# Patient Record
Sex: MALE | Race: WHITE | NOT HISPANIC OR LATINO | Employment: FULL TIME | URBAN - METROPOLITAN AREA
[De-identification: names, ages, dates, MRNs, and addresses within clinical notes are randomized per-mention and may not be internally consistent; named-entity substitution may affect disease eponyms.]

---

## 2023-10-04 PROBLEM — B35.6 TINEA CRURIS: Status: ACTIVE | Noted: 2023-10-04

## 2023-10-04 PROBLEM — K57.32 SIGMOID DIVERTICULITIS: Status: ACTIVE | Noted: 2023-10-04

## 2023-10-04 PROBLEM — K29.60 BILE REFLUX GASTRITIS: Status: ACTIVE | Noted: 2023-10-04

## 2023-10-04 PROBLEM — K92.1 HEMATOCHEZIA: Status: ACTIVE | Noted: 2023-10-04

## 2023-10-04 PROBLEM — L20.9 ATOPIC DERMATITIS, UNSPECIFIED: Status: ACTIVE | Noted: 2023-01-10

## 2023-10-04 RX ORDER — CYCLOBENZAPRINE HCL 10 MG
1 TABLET ORAL NIGHTLY PRN
COMMUNITY
Start: 2023-07-27 | End: 2023-10-09 | Stop reason: ALTCHOICE

## 2023-10-04 RX ORDER — AMOXICILLIN AND CLAVULANATE POTASSIUM 875; 125 MG/1; MG/1
TABLET, FILM COATED ORAL
COMMUNITY
Start: 2022-12-14 | End: 2023-10-09 | Stop reason: ALTCHOICE

## 2023-10-04 RX ORDER — NAPROXEN 500 MG/1
1 TABLET ORAL EVERY 12 HOURS PRN
COMMUNITY
Start: 2023-07-27 | End: 2023-10-09 | Stop reason: ALTCHOICE

## 2023-10-04 RX ORDER — OMEPRAZOLE 40 MG/1
1 CAPSULE, DELAYED RELEASE ORAL
COMMUNITY
Start: 2023-08-04 | End: 2023-10-09 | Stop reason: ALTCHOICE

## 2023-10-09 ENCOUNTER — OFFICE VISIT (OUTPATIENT)
Dept: PRIMARY CARE | Facility: CLINIC | Age: 23
End: 2023-10-09
Payer: COMMERCIAL

## 2023-10-09 VITALS
SYSTOLIC BLOOD PRESSURE: 126 MMHG | HEART RATE: 74 BPM | DIASTOLIC BLOOD PRESSURE: 72 MMHG | OXYGEN SATURATION: 96 % | HEIGHT: 69 IN | WEIGHT: 148.6 LBS | TEMPERATURE: 98.7 F | BODY MASS INDEX: 22.01 KG/M2

## 2023-10-09 DIAGNOSIS — M54.9 UPPER BACK PAIN ON LEFT SIDE: Primary | ICD-10-CM

## 2023-10-09 DIAGNOSIS — L21.9 SEBORRHEIC DERMATITIS: ICD-10-CM

## 2023-10-09 PROCEDURE — 99213 OFFICE O/P EST LOW 20 MIN: CPT | Performed by: INTERNAL MEDICINE

## 2023-10-09 PROCEDURE — 1036F TOBACCO NON-USER: CPT | Performed by: INTERNAL MEDICINE

## 2023-10-09 RX ORDER — METHYLPREDNISOLONE 4 MG/1
TABLET ORAL
Qty: 21 TABLET | Refills: 0 | Status: SHIPPED | OUTPATIENT
Start: 2023-10-09 | End: 2023-10-16

## 2023-10-09 ASSESSMENT — PATIENT HEALTH QUESTIONNAIRE - PHQ9
2. FEELING DOWN, DEPRESSED OR HOPELESS: NOT AT ALL
1. LITTLE INTEREST OR PLEASURE IN DOING THINGS: NOT AT ALL
SUM OF ALL RESPONSES TO PHQ9 QUESTIONS 1 AND 2: 0

## 2023-10-09 ASSESSMENT — PAIN SCALES - GENERAL: PAINLEVEL: 6

## 2023-10-09 ASSESSMENT — ENCOUNTER SYMPTOMS
LOSS OF SENSATION IN FEET: 0
OCCASIONAL FEELINGS OF UNSTEADINESS: 0
DEPRESSION: 0

## 2023-10-09 NOTE — PROGRESS NOTES
"Subjective   Patient ID: James Johnson is a 23 y.o. male who presents for Back Pain (Bump on forehead).    HPI     Patient is complaining of upper back, neck pain radiating towards back of lower head since last few months Pain is 5-6/10. Has tingling in the middle of the spine. Has pain during the week when he lists heavy stuff more than 25 pounds at work. Has no pain during weekend when he rests.  Ibuprofen 400 mg helps a little.  Naprosyn caused stomach pain, and had no releif with cyclobenzaprinf    Review of Systems   Constitutional:  Negative for fatigue and unexpected weight change.   HENT:  Negative for congestion, ear pain, sinus pain and sore throat.    Respiratory:  Negative for cough, shortness of breath and wheezing.    Cardiovascular:  Negative for chest pain, palpitations and leg swelling.   Gastrointestinal:  Negative for abdominal pain, blood in stool and constipation.   Endocrine: Negative for polydipsia, polyphagia and polyuria.   Genitourinary:  Negative for hematuria and urgency.   Musculoskeletal:  Positive for back pain (Upper back pain). Negative for arthralgias and joint swelling.   Skin:  Negative for rash.        Tiny bump on left side of forehead near hairline   Neurological:  Negative for tremors, syncope, weakness and numbness.   Psychiatric/Behavioral:  Negative for behavioral problems. The patient is not nervous/anxious.        Objective   /72 (BP Location: Left arm, Patient Position: Sitting, BP Cuff Size: Adult)   Pulse 74   Temp 37.1 °C (98.7 °F) (Temporal)   Ht 1.753 m (5' 9\")   Wt 67.4 kg (148 lb 9.6 oz)   SpO2 96%   BMI 21.94 kg/m²     Physical Exam  Constitutional:       Appearance: Normal appearance.   HENT:      Head: Normocephalic and atraumatic.   Eyes:      Extraocular Movements: Extraocular movements intact.   Cardiovascular:      Rate and Rhythm: Normal rate and regular rhythm.      Pulses: Normal pulses.      Heart sounds: Normal heart sounds. "   Pulmonary:      Effort: Pulmonary effort is normal.      Breath sounds: Normal breath sounds.   Abdominal:      General: Abdomen is flat. Bowel sounds are normal.   Musculoskeletal:      Thoracic back: No deformity, spasms, tenderness or bony tenderness. No scoliosis.   Skin:     Comments: Left side of forehead small nontender swelling- benign? Dry scaly scalp   Neurological:      Mental Status: He is alert.         Assessment/Plan       James was seen today for back pain.  Diagnoses and all orders for this visit:  Upper back pain on left side (Primary)  -     XR thoracic spine 3 views; Future  -     Referral to Physical Therapy; Future  -     methylPREDNISolone (Medrol Dospak) 4 mg tablets; Take as directed on package.  Seborrheic dermatitis  Comments:  Advised to use head and shoulders shampoo, wash at least twice a week     Advised follow-up in 4 to 6 weeks if no relief with physical therapy

## 2023-10-11 RX ORDER — HYDROCORTISONE 25 MG/G
CREAM TOPICAL
COMMUNITY
Start: 2023-02-24 | End: 2023-10-12 | Stop reason: ALTCHOICE

## 2023-10-11 ASSESSMENT — ENCOUNTER SYMPTOMS
ABDOMINAL PAIN: 0
UNEXPECTED WEIGHT CHANGE: 0
SINUS PAIN: 0
HEMATURIA: 0
WHEEZING: 0
ARTHRALGIAS: 0
PALPITATIONS: 0
SHORTNESS OF BREATH: 0
NERVOUS/ANXIOUS: 0
TREMORS: 0
NUMBNESS: 0
FATIGUE: 0
COUGH: 0
BACK PAIN: 1
SORE THROAT: 0
CONSTIPATION: 0
POLYDIPSIA: 0
JOINT SWELLING: 0
WEAKNESS: 0
POLYPHAGIA: 0
BLOOD IN STOOL: 0

## 2023-10-12 ENCOUNTER — OFFICE VISIT (OUTPATIENT)
Dept: SURGERY | Facility: CLINIC | Age: 23
End: 2023-10-12
Payer: COMMERCIAL

## 2023-10-12 VITALS
HEART RATE: 109 BPM | WEIGHT: 150 LBS | OXYGEN SATURATION: 98 % | SYSTOLIC BLOOD PRESSURE: 106 MMHG | BODY MASS INDEX: 21.47 KG/M2 | HEIGHT: 70 IN | TEMPERATURE: 98.1 F | DIASTOLIC BLOOD PRESSURE: 62 MMHG

## 2023-10-12 DIAGNOSIS — K64.8 BLEEDING INTERNAL HEMORRHOIDS: Primary | ICD-10-CM

## 2023-10-12 PROCEDURE — 46221 LIGATION OF HEMORRHOID(S): CPT | Performed by: SURGERY

## 2023-10-12 PROCEDURE — 1036F TOBACCO NON-USER: CPT | Performed by: SURGERY

## 2023-10-12 PROCEDURE — 99212 OFFICE O/P EST SF 10 MIN: CPT | Performed by: SURGERY

## 2023-10-12 ASSESSMENT — PATIENT HEALTH QUESTIONNAIRE - PHQ9
SUM OF ALL RESPONSES TO PHQ9 QUESTIONS 1 AND 2: 0
1. LITTLE INTEREST OR PLEASURE IN DOING THINGS: NOT AT ALL
2. FEELING DOWN, DEPRESSED OR HOPELESS: NOT AT ALL

## 2023-10-12 ASSESSMENT — LIFESTYLE VARIABLES
HOW MANY STANDARD DRINKS CONTAINING ALCOHOL DO YOU HAVE ON A TYPICAL DAY: 3 OR 4
AUDIT-C TOTAL SCORE: 3
HOW OFTEN DO YOU HAVE A DRINK CONTAINING ALCOHOL: 2-4 TIMES A MONTH
SKIP TO QUESTIONS 9-10: 0
HOW OFTEN DO YOU HAVE SIX OR MORE DRINKS ON ONE OCCASION: NEVER

## 2023-10-12 ASSESSMENT — PAIN SCALES - GENERAL: PAINLEVEL: 0-NO PAIN

## 2023-10-12 ASSESSMENT — ENCOUNTER SYMPTOMS: RECTAL BLEEDING: 1

## 2023-10-12 NOTE — LETTER
October 12, 2023     Patient: James Johnson   YOB: 2000   Date of Visit: 10/12/2023       To Whom It May Concern:    It is my medical opinion that James Johnson may return to full duty immediately with no restrictions.    If you have any questions or concerns, please don't hesitate to call.         Sincerely,        Josue Jimenez MD    CC: No Recipients

## 2023-10-12 NOTE — PROGRESS NOTES
Patient ID: James Johnson is a 23 y.o. male.    Anoscopy    Date/Time: 10/12/2023 11:08 AM    Performed by: Josue Jimenez MD  Authorized by: Josue Jimenez MD    Consent:     Consent obtained:  Written    Consent given by:  Patient    Risks, benefits, and alternatives were discussed: yes      Risks discussed:  Bleeding and pain    Alternatives discussed:  No treatment, alternative treatment and observation  Universal protocol:     Procedure explained and questions answered to patient or proxy's satisfaction: yes      Relevant documents present and verified: yes      Test results available: yes      Imaging studies available: yes      Required blood products, implants, devices, and special equipment available: yes      Site/side marked: yes      Immediately prior to procedure, a time out was called: yes      Patient identity confirmed:  Verbally with patient and provided demographic data  Indications:     Indications: rectal bleeding    Procedure details:     Internal hemorrhoids: yes      Internal hemorrhoid position:  Three o'clock    Inflammation: no      Anal fissures: no      Anal fistulae: no      Anal stricture: no      Abscess: no      Tearing: no      Blood in rectal vault: no    Post-procedure details:     Procedure completion:  Tolerated well, no immediate complications  Comments:      Risks, benefits, and alternatives were explained to the patient.  Risks include but are not limited to bleeding, recurrence of the hemorrhoids, need for further rubberbanding or surgical procedures, or pain.  Patient agrees to proceed and consents to anoscopy and rubber band ligation internal hemorrhoids.    The patient was placed in the prone jackknife position.  A lubricated anoscope was inserted and the anus was inspected.  No masses were noted.  The most prominent hemorrhoid was located in the right middle quadrant.  An asensate area of this hemorrhoid was grasped with the [place] forcep and 2 rubber bands  were applied.  The anoscope was then removed.  Estimated blood loss was 1 mL.  The patient tolerated the procedure well and was discharged to home in good condition.

## 2023-10-13 ENCOUNTER — EVALUATION (OUTPATIENT)
Dept: PHYSICAL THERAPY | Facility: CLINIC | Age: 23
End: 2023-10-13
Payer: COMMERCIAL

## 2023-10-13 DIAGNOSIS — M54.6 THORACIC BACK PAIN: Primary | ICD-10-CM

## 2023-10-13 PROCEDURE — 97161 PT EVAL LOW COMPLEX 20 MIN: CPT | Mod: GP

## 2023-10-13 PROCEDURE — 97110 THERAPEUTIC EXERCISES: CPT | Mod: GP

## 2023-10-13 NOTE — LETTER
October 13, 2023     Patient: James Johnson   YOB: 2000   Date of Visit: 10/13/2023       To Whom It May Concern:    It is my medical opinion that James Johnson {Work release (duty restriction):71344}.    If you have any questions or concerns, please don't hesitate to call.         Sincerely,        Chapo Mosquera, PT    CC: No Recipients

## 2023-10-13 NOTE — LETTER
October 13, 2023     Patient: James Johnson   YOB: 2000   Date of Visit: 10/13/2023       To Whom it May Concern:    James Johnson was seen in my clinic on 10/13/2023. He {Return to school/sport:52435}.    If you have any questions or concerns, please don't hesitate to call.         Sincerely,          Chapo Mosquera, PT        CC: No Recipients

## 2023-10-13 NOTE — PROGRESS NOTES
Physical Therapy Evaluation     Patient Name: James Johnson  MRN: [id number]  Today's Date: 10/13/2023  Time Calculation  Start Time: 0747  Stop Time: 0841  Time Calculation (min): 54 min      Insurance:  Visit number: 1 of ?  Authorization info: ?  Insurance Type: [place]    Current Problem:   1. Thoracic back pain  Follow Up In Physical Therapy          Precautions:       Reason for visit: Neck and left periscap pain   Referred by: Dr. 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   Leisure, mechanical stresses: Drive car, go to car shows, go our with friends.   Prior to onset the pt was able to complete all work/leisure/functional activities without limitation.  Functional disability from present episode: none stated.  Present symptoms: Neck and left periscap  Present since: July 2023 and unchanging  Commenced lifting something at work with a crane, grabbed a cart with left hand was pulled/twist   Symptoms at onset: Left periscap and neck, had some dizziness initally but that went away  Constant symptoms: None  Intermittent symptoms: Neck and left periscap  Pain ranges from: 0-7/10  Pt describes pain as: ache, tingling  Symptoms are worse with: lifting/straining,as the day progresses, when still/on the move, continuous use of left arm   Symptoms are better with:  significantly better with rest/on the weekends, lying, sitting, lying, am  Disturbed sleep:  no  Previous episodes: no  Previous treatments: Ibuprofen helps a little, Prescribed a steroid pack has not started it yet. Will have to go back to MD if medication and PT does not help.   Imaging: xrays order but not taken - T/S  Dizziness - yes initially but none now. tinnitus/nausea/swallowing - no  Red Flags: recent/major surgery - Colonoscopy in May 2023 , night pain - no, accidents - no, unexplained weight loss - no    C/S Objective:  Outcome Measures:  Other Measures  Neck Disability Index:  12%    - Posture: Poor   - Correction: Worse     - Lateral shift nil         - Movement Loss- Nil/Min/Mod/Max loss or degrees       - c/s Retraction: min       - c/s Protraction: nil       - c/s Flexion: nil       - c/s Extension: min - mild left deviation        - c/s R rot: nil       - c/s L rot: nil        - c/s R SB: nil       - c/s L SB: nil        - T/S R rot: nil       - T/S L rot nil       - T/S flexion: nil, ERP       - T/S ext: min        - B GHJs AROM: flexion/abd nil limit PDM   - MMT; B GHJs       - Flexion: 5/5, pain        - abd: 5/5        - ER: 5/5      - Repeated Movement Testing -  During/after/mechanical response:       -Sx in lyin-2/10 left periscap           - Prone to/from [place] x 3 min - Increase, NW           - REIL with hands high x 10 - NE, increase, NW           - Prone c/s ret/ext x 10 - NE, increasing, W       - Sx in sittin-3/10 left periscap           - T/S ext over chair x 20 - increase, NW          - C/S ret/ext x 10 - increase, NW         - Sx in lying 2/10 left periscap          - Sustained Left rot in flexion x 3 min - NE, NE    Treatment:      - C/s ret/ext x 10     - T/S ext over chair x 10   Educated pt on proper response to exercise, centralization/localization, produce/increase - NW principle, and assessment process.  Issued handout for HEP    HEP/med bridge: Access Code: [email or web address]  URL: [email or web address]  Date: 10/13/2023  Prepared by: Chapo Mosquera  Exercises  - Seated Cervical Extension AROM  - 5-8 x daily - 7 x weekly - 1 sets - 10-20 reps  - Seated Thoracic Lumbar Extension  - 5-8 x daily - 7 x weekly - 1 sets - 10-20 reps    Assessment:  Pt presents to PT with complaint of neck and left periscap pain/paresthesia that began in 2023 while attempting to prevent a cart from falling at work. Pt's history and response to repeated movements makes provisional classification c/s derangement. Pt will continue to be assessed over the next 3-5  sessions to confirm provisional classification and DP. Pt will benefit from skilled PT to address ROM/strength/functional limitations and pain noted during evaluation today.    Plan of Care:  - Problem List:Pain, Functional limitations, activity limitations, ROM limitations, Posture, Transfer, Activity Limitations, IADLS/ADLS/Self care  - C/S goals:  STG:  Pain = 0-3/10 neck/Left periscap by week 4  Pt will report good HEP compliance by week 2  LTG:   C/S and T/S  ROM = nil limit in all directions by week 6  NDI </= 2% by week 8  Pt will report >/= 80% improvement/progress towards PT goals by week 8  Pt will be able to lift heavy objects at work without increase/production of neck/periscap sx. For >/= 3 days a week by week 8   - Planned interventions: Ther ex, Neuromuscular re-ed, ther act, Manual, Education, Home program, Estim, Hot therapy, Cold therapy, Vaso

## 2023-10-16 ENCOUNTER — ANCILLARY PROCEDURE (OUTPATIENT)
Dept: RADIOLOGY | Facility: CLINIC | Age: 23
End: 2023-10-16
Payer: COMMERCIAL

## 2023-10-16 DIAGNOSIS — M54.9 UPPER BACK PAIN ON LEFT SIDE: ICD-10-CM

## 2023-10-16 PROCEDURE — 72072 X-RAY EXAM THORAC SPINE 3VWS: CPT | Performed by: RADIOLOGY

## 2023-10-16 PROCEDURE — 72072 X-RAY EXAM THORAC SPINE 3VWS: CPT | Mod: FY

## 2023-10-24 ENCOUNTER — TREATMENT (OUTPATIENT)
Dept: PHYSICAL THERAPY | Facility: CLINIC | Age: 23
End: 2023-10-24
Payer: COMMERCIAL

## 2023-10-24 DIAGNOSIS — M54.6 THORACIC BACK PAIN: ICD-10-CM

## 2023-10-24 PROCEDURE — 97110 THERAPEUTIC EXERCISES: CPT | Mod: GP,CQ

## 2023-10-24 PROCEDURE — 97140 MANUAL THERAPY 1/> REGIONS: CPT | Mod: GP,CQ

## 2023-10-24 PROCEDURE — 97010 HOT OR COLD PACKS THERAPY: CPT | Mod: GP,CQ

## 2023-10-24 PROCEDURE — 97014 ELECTRIC STIMULATION THERAPY: CPT | Mod: GP,CQ

## 2023-10-24 ASSESSMENT — PAIN - FUNCTIONAL ASSESSMENT: PAIN_FUNCTIONAL_ASSESSMENT: 0-10

## 2023-10-24 ASSESSMENT — PAIN SCALES - GENERAL: PAINLEVEL_OUTOF10: 0 - NO PAIN

## 2023-10-24 NOTE — PROGRESS NOTES
Physical Therapy Treatment    Patient Name: James Johnson  MRN: [id number]  Today's Date: 10/24/2023  Time Calculation  Start Time: 0830  Stop Time: 0927  Time Calculation (min): 57 min  Evaluating Therapist: Eveline  Co-Signing Therapist: Isaura  Current Problem  1. Thoracic back pain  Follow Up In Physical Therapy          Insurance:  Visit number: 2 of 60  Authorization info: no auth required   Insurance Type: [place]  Onset Date July 2023    Subjective   General  General Comment: Patient has been avoiding any heavy lifting at this time.  He feels when he gets to work, the pain will increase with repetitive heavy lifting. Pain can increase to 6/10 by the end of a work shift. Not much change w/ current HEP.    Performing HEP?: Yes    Precautions   Reviewed Medical Health History Form - [facility]  Pain  Pain Assessment: 0-10  Pain Score: 0 - No pain    Objective   Poor posture - FWD head and rounded shoulders.     Treatments:  Therapeutic Exercise  Therapeutic Exercise Activity 1: sitting with lumbar roll x 2 min  Therapeutic Exercise Activity 2: seated cervical ret/ext x 10  Therapeutic Exercise Activity 3: seated thoracic ext x 10  Therapeutic Exercise Activity 4: supine LG PVC FF x 2 min  Therapeutic Exercise Activity 5: supine YTB loop iso ER w/ serratus lift x 10  Therapeutic Exercise Activity 6: prone L shld ext w/ IR 2 x 10  Therapeutic Exercise Activity 7: prone H ABD 2 x 10  Therapeutic Exercise Activity 8: prone LT 2 x 10  Therapeutic Exercise Activity 9: doorway pec stretch 2 x 1 min              Manual Therapy  Manual Therapy Activity 1: MFR L arm pull  Manual Therapy Activity 2: STM/DSTM L med scap, rhomboids, UT/LEV    Modalities  Modality 1: Untimed ESU - Electrical Stimulation Unattended, Untimed Cold Pack (TENS to L thoracic/basilio-scap seated w/ CP 9.0 mA x 15' - bell)    OP EDUCATION:  Education  Education Comment: Access Code: [email or web address]  URL: [email or web address]  Date:  10/24/2023  Prepared by: Josephine Davey    Exercises  - Prone Shoulder Internal Rotation and Extension  - 1 x daily - 7 x weekly - 3 sets - 10 reps  - Prone Shoulder Horizontal Abduction  - 1 x daily - 7 x weekly - 3 sets - 10 reps  - Prone Single Arm Shoulder Y  - 1 x daily - 7 x weekly - 3 sets - 10 reps  - Doorway Pec Stretch at 90 Degrees Abduction  - 3 x daily - 7 x weekly - 2 reps - 60 hold    Assessment:  PT Assessment  Assessment Comment: Patient presents with poor posture and muscle imbalances.  Updated HEP to include pec stretching and prone scapular strengthening.  He was a bit sore after ther ex. Pain reduced w/ CP and TENS.    Plan:  OP PT Plan  Duration: 2/60  Assess TENS and scapular strengthening.   Goals:  Active       PT Problem       PT Goal 1       Start:  10/13/23    Expected End:  12/12/23       STG:  Pain = 0-3/10 neck/Left periscap by week 4  Pt will report good HEP compliance by week 2  LTG:   C/S and T/S  ROM = nil limit in all directions by week 6  NDI </= 2% by week 8  Pt will report >/= 80% improvement/progress towards PT goals by week 8  Pt will be able to lift heavy objects at work without increase/production of neck/periscap sx. For >/= 3 days a week by week 8               Dottie Davey, PTA

## 2023-10-31 ENCOUNTER — TREATMENT (OUTPATIENT)
Dept: PHYSICAL THERAPY | Facility: CLINIC | Age: 23
End: 2023-10-31
Payer: COMMERCIAL

## 2023-10-31 DIAGNOSIS — M54.6 THORACIC BACK PAIN, UNSPECIFIED BACK PAIN LATERALITY, UNSPECIFIED CHRONICITY: Primary | ICD-10-CM

## 2023-10-31 DIAGNOSIS — M54.6 THORACIC BACK PAIN: ICD-10-CM

## 2023-10-31 PROCEDURE — 97110 THERAPEUTIC EXERCISES: CPT | Mod: GP,CQ

## 2023-10-31 PROCEDURE — 97140 MANUAL THERAPY 1/> REGIONS: CPT | Mod: GP,CQ

## 2023-10-31 ASSESSMENT — PAIN SCALES - GENERAL: PAINLEVEL_OUTOF10: 0 - NO PAIN

## 2023-10-31 ASSESSMENT — PAIN - FUNCTIONAL ASSESSMENT: PAIN_FUNCTIONAL_ASSESSMENT: 0-10

## 2023-10-31 NOTE — PROGRESS NOTES
"  Physical Therapy Treatment    Patient Name: James Johnson  MRN: [id number]  Today's Date: 10/31/2023  Time Calculation  Start Time: 0807  Stop Time: 0832  Time Calculation (min): 25 min    Current Problem  1. Thoracic back pain, unspecified back pain laterality, unspecified chronicity        2. Thoracic back pain  Follow Up In Physical Therapy          Insurance:   3/60            Subjective   General  General Comment: Patient has been trying to avoid lifting heavy objects.  He did lift a bucket which caused some discomfort, which subsided by the next day.  Abreviated appt today - patient 20 min late. (Has not been able to do HEP due to being tired when he gets home from work)    Performing HEP?: No    Precautions     Pain  Pain Assessment: 0-10  Pain Score: 0 - No pain    Objective       Treatments:    Therapeutic Exercise  Therapeutic Exercise Activity 1: UBE Man L1 F/R x 4 min total  Therapeutic Exercise Activity 2: doorway pec stretch low and mid x 1 min ea  Therapeutic Exercise Activity 3: RTB rows 2 x 10  Therapeutic Exercise Activity 4: RTB shld ext 2 x 10  Therapeutic Exercise Activity 5: RTB B ER 2 x 10  Therapeutic Exercise Activity 6: supine Lg PVC FF 5\" x 2 min  Therapeutic Exercise Activity 7: supine chin tuck x 10         Manual Therapy  Manual Therapy Activity 1: SOR  Manual Therapy Activity 2: gentle cervical distraction  Manual Therapy Activity 3: stretch B UT/LEV                     OP EDUCATION:  Education  Education Comment: Access Code: [email or web address]  URL: [email or web address]  Date: 10/31/2023  Prepared by: Josephine Davey    Exercises  - Doorway Pec Stretch at 90 Degrees Abduction  - 1 x daily - 7 x weekly - 3 reps - 30 hold  - Shoulder External Rotation and Scapular Retraction with Resistance  - 1 x daily - 7 x weekly - 3 sets - 10 reps  - Standing Shoulder Row with Anchored Resistance  - 1 x daily - 7 x weekly - 3 sets - 10 reps  - Shoulder extension with resistance - " Neutral  - 1 x daily - 7 x weekly - 3 sets - 10 reps    Assessment:  PT Assessment  Assessment Comment: Modified session due to late arrival. Fatigues easily with ther ex.    Plan:  OP PT Plan  Duration: 3/60    Goals:  Active       PT Problem       PT Goal 1       Start:  10/13/23    Expected End:  12/12/23       STG:  Pain = 0-3/10 neck/Left periscap by week 4  Pt will report good HEP compliance by week 2  LTG:   C/S and T/S  ROM = nil limit in all directions by week 6  NDI </= 2% by week 8  Pt will report >/= 80% improvement/progress towards PT goals by week 8  Pt will be able to lift heavy objects at work without increase/production of neck/periscap sx. For >/= 3 days a week by week 8               Dottie Davey, PTA

## 2023-10-31 NOTE — PROGRESS NOTES
Physical Therapy Treatment    Patient Name: James Johnson  MRN: [id number]  Today's Date: 10/31/2023       Current Problem  No diagnosis found.    Insurance:               Subjective   General       Performing HEP?: {Yes/No:08209}    Precautions     Pain       Objective       Treatments:                                    OP EDUCATION:       Assessment:       Plan:       Goals:  Active       PT Problem       PT Goal 1       Start:  10/13/23    Expected End:  12/12/23       STG:  Pain = 0-3/10 neck/Left periscap by week 4  Pt will report good HEP compliance by week 2  LTG:   C/S and T/S  ROM = nil limit in all directions by week 6  NDI </= 2% by week 8  Pt will report >/= 80% improvement/progress towards PT goals by week 8  Pt will be able to lift heavy objects at work without increase/production of neck/periscap sx. For >/= 3 days a week by week 8               Dottie Davey, PTA

## 2023-11-07 ENCOUNTER — TREATMENT (OUTPATIENT)
Dept: PHYSICAL THERAPY | Facility: CLINIC | Age: 23
End: 2023-11-07
Payer: COMMERCIAL

## 2023-11-07 DIAGNOSIS — M54.6 THORACIC BACK PAIN: ICD-10-CM

## 2023-11-07 PROCEDURE — 97110 THERAPEUTIC EXERCISES: CPT | Mod: GP

## 2023-11-07 NOTE — PROGRESS NOTES
Physical Therapy Treatment    Patient Name: James Johnson  MRN: [id number]  Today's Date: 11/7/2023       Current Problem  1. Thoracic back pain  Follow Up In Physical Therapy          Insurance:  Number of Treatments Authorized: 4/8  Certification Period Start Date: 10/13/23  Certification Period End Date: 04/10/23      Subjective   General    - pain is better at night and in the am but then worsens during the day.  This is a typical cycle and lifting can flare the sx but then they get better with rest. Gets some burning with the exercise but not too bad.  Overall things are about the same since starting PT. Feels fine after each session, no better or worse.     Performing HEP?: yes x 1 a day.  From the last PT session    Precautions  Precautions  Precautions Comment: no fall risk  Pain   1/10 - burning left GHJ blade.     Objective   - Movement Loss - Nil/Min/Mod/Max loss or degrees      - Retraction: min, increase      - Protraction: NT      - Flexion: nil, increase/pull      - Extension: min, increase/pull      - R rot: nil      - L rot: nil      - R SB: nil, increase      - L SB: min, NE  Lifted and took several steps with 35# KB - min pain     Treatments:  Therex:   Scifit UE CW quick start x 6 min   C/S ret x 20 - increase, NW, NE on baselines   C/S ret with self OP x 20 - increase, W  C/S flexion with self OP x 10 - increase, W  Left SBing 10 x 2 - NE, NE  Left SBing with self OP x 10 - decrease, Better    OP EDUCATION:   Educated on importance of HEP compliance to assess and to expect improving in sx.     Access Code: [email or web address]  URL: [email or web address]  Date: 11/07/2023  Prepared by: Chapo Mosquera    Exercises  - Seated Cervical L Sidebending AROM  - 5-8 x daily - 7 x weekly - 1 sets - 10 reps  - Seated Cervical L Sidebending Stretch  - 5-8 x daily - 7 x weekly - 1 sets - 5 reps  - Seated Cervical Retraction  - 5-8 x daily - 7 x weekly - 1 sets - 10 reps    Assessment:   Pt  continues to have minimal progress with fair HEP compliance. Only loaded procedures were assessed today to enable for HEP compliance.  Had reduction in left periscap pain after left SBing, mid line procedures had no improvement or worsened sx.     Plan:  OP PT Plan  Certification Period Start Date: 10/13/23  Certification Period End Date: 04/10/23  Number of Treatments Authorized: 4/8    Goals:  Active       PT Problem       PT Goal 1       Start:  10/13/23    Expected End:  12/12/23       STG:  Pain = 0-3/10 neck/Left periscap by week 4  Pt will report good HEP compliance by week 2  LTG:   C/S and T/S  ROM = nil limit in all directions by week 6  NDI </= 2% by week 8  Pt will report >/= 80% improvement/progress towards PT goals by week 8  Pt will be able to lift heavy objects at work without increase/production of neck/periscap sx. For >/= 3 days a week by week 8               Chapo Mosquera, PT

## 2023-11-16 ENCOUNTER — APPOINTMENT (OUTPATIENT)
Dept: SURGERY | Facility: CLINIC | Age: 23
End: 2023-11-16
Payer: COMMERCIAL

## 2023-11-16 PROBLEM — K64.9 HEMORRHOIDS: Status: ACTIVE | Noted: 2023-11-16

## 2023-11-17 ENCOUNTER — TREATMENT (OUTPATIENT)
Dept: PHYSICAL THERAPY | Facility: CLINIC | Age: 23
End: 2023-11-17
Payer: COMMERCIAL

## 2023-11-17 DIAGNOSIS — M54.6 THORACIC BACK PAIN: ICD-10-CM

## 2023-11-17 PROCEDURE — 97110 THERAPEUTIC EXERCISES: CPT | Mod: GP

## 2023-11-17 NOTE — PROGRESS NOTES
Physical Therapy Treatment    Patient Name: James Johnson  MRN: [id number]  Today's Date: 11/17/2023  Time Calculation  Start Time: 0840  Stop Time: 0918  Time Calculation (min): 38 min    Current Problem  1. Thoracic back pain  Follow Up In Physical Therapy          Insurance:  Number of Treatments Authorized: 5/8  Certification Period Start Date: 10/13/23  Certification Period End Date: 04/10/24      Subjective   General    He is feeling better.  Been avoiding lifting things at work which he feels is helping.       Performing HEP?: yes x 5-6 a day Left SB, SB with OP and retraction     Precautions  Precautions  Precautions Comment: no fall risk  Pain   0/10 left periscap, neck stiffness/soreness - pt did not rate    Objective   - Movement Loss - Nil/Min/Mod/Max loss or degrees      - Retraction: min, increase      - Protraction: NT      - Flexion: nil, increase/pull      - Extension: min, increase/pull      - R rot: nil      - L rot: min      - R SB: nil, increase      - L SB: min, NE    Treatments:  Therex:   Left SBing x 10   Left SBing with self OP x 10 - P left lat, NW  C/S ret x 10   C/S ret with self OP 10 x 2 - increase, NW, NE on ROM   C/S ret/ext x 10 - increase, P periscap   C/S left rot x 10 - increase,   C/S left rot with OP x 10 - increase, NW  Matrix:  B rows 10#s 1 min x 2   B ext 12.5#s 1 min x 2   B overhead press 5#s 12 x 2   Matrix: chest Press 10#s 10 x 2     OP EDUCATION:  Access Code: [email or web address]  URL: [email or web address]  Date: 11/07/2023  Prepared by: Chapo Moqsuera    Exercises  - Seated Cervical L Sidebending AROM  - 5-8 x daily - 7 x weekly - 1 sets - 10 reps  - Seated Cervical L Sidebending Stretch  - 5-8 x daily - 7 x weekly - 1 sets - 5 reps  - Left rot x 10, 5-8 x a day   - Seated Cervical Retraction  - 5-8 x daily - 7 x weekly - 1 sets - 10 reps    Access Code: [email or web address]  URL: [email or web address]  Date: 11/17/2023  Prepared by:  Chapo Mosquera    Exercises  - Standing Shoulder Row with Anchored Resistance  - 1 x daily - 2-3 x weekly - 2-3 sets - 10-20 reps  - Shoulder extension with resistance - Neutral  - 1 x daily - 2-3 x weekly - 2-3 sets - 10-20 reps  - Shoulder Press on Swiss Ball  - 1 x daily - 2-3 x weekly - 2-3 sets - 10-20 reps  - Pec Fly Machine  - 1 x daily - 2-3 x weekly - 2-3 sets - 10-20 reps    Assessment:   Pt continues to respond best to lateral procedures.  Midline has nil effect or worsens baselines, unable to assess unloaded/alternatives due to inability to be compliant with these procedures with HEP.  Tolerated all scp/UE strengthening well, c/o of periscap sx with GHJ press but returned to baseline quickly with rest.     Plan:  OP PT Plan  Certification Period Start Date: 10/13/23  Certification Period End Date: 04/10/24  Number of Treatments Authorized: 5/8    Goals:  Active       PT Problem       PT Goal 1       Start:  10/13/23    Expected End:  12/12/23       STG:  Pain = 0-3/10 neck/Left periscap by week 4  Pt will report good HEP compliance by week 2  LTG:   C/S and T/S  ROM = nil limit in all directions by week 6  NDI </= 2% by week 8  Pt will report >/= 80% improvement/progress towards PT goals by week 8  Pt will be able to lift heavy objects at work without increase/production of neck/periscap sx. For >/= 3 days a week by week 8               Chapo Mosquera, PT

## 2023-11-20 ENCOUNTER — APPOINTMENT (OUTPATIENT)
Dept: SURGERY | Facility: CLINIC | Age: 23
End: 2023-11-20
Payer: COMMERCIAL

## 2023-11-28 ENCOUNTER — APPOINTMENT (OUTPATIENT)
Dept: PHYSICAL THERAPY | Facility: CLINIC | Age: 23
End: 2023-11-28
Payer: COMMERCIAL

## 2023-12-08 ENCOUNTER — TREATMENT (OUTPATIENT)
Dept: PHYSICAL THERAPY | Facility: CLINIC | Age: 23
End: 2023-12-08
Payer: COMMERCIAL

## 2023-12-08 DIAGNOSIS — M54.6 THORACIC BACK PAIN: ICD-10-CM

## 2023-12-08 PROCEDURE — 97140 MANUAL THERAPY 1/> REGIONS: CPT | Mod: GP

## 2023-12-08 PROCEDURE — 97110 THERAPEUTIC EXERCISES: CPT | Mod: GP

## 2023-12-08 NOTE — PROGRESS NOTES
Physical Therapy Treatment    Patient Name: James Johnson  MRN: [id number]  Today's Date: 12/8/2023       Current Problem  1. Thoracic back pain  Follow Up In Physical Therapy          Insurance:  Number of Treatments Authorized: 6/8  Certification Period Start Date: 10/13/23  Certification Period End Date: 04/10/24      Subjective   General    Overall about the same.  His heavy lifting at work at work has increased again and his sx flared last night.     Performing HEP?: yes    Precautions  Precautions  Precautions Comment: no fall risk  Pain   4/10 left periscap - pt did not rate    Objective   - Movement Loss - Nil/Min/Mod/Max loss or degrees      - Retraction: min      - Protraction: NT      - Flexion: nil      - Extension: min      - R rot: nil      - L rot: nil      - R SB: nil       - L SB: nil   Left GHJ scapular humeral pattern - normal  Left GHJ abd/flexion = nil limit, good movement pattern.     Treatments:  Therex:   Scifit UE F/B x 3/3 min   T/S ext over chair x 1 min  T/S left rot x 1 min   Right rot, rot with self OP x 1 min each - NE, NE  Right Sbing x 1 min   Left SBing with self OP x 1 min   C/S Flexion x 1 min   C/S ret x 1 min   C/S ret/ext x 1 min   Supine with Green band x 1 min each:  B Hor abd  B overhead band pull down  B ER    Manual:  DTM/TPR to L mid trap/rhomboids/ thoracic paraspinals   C7-T3 ext mobs in prone grade 2-3    OP EDUCATION:  Educated on proper sx response with updated HEP    Access Code: [email or web address]  URL: [email or web address]  Date: 12/08/2023  Prepared by: Chapo Mosquera    Program Notes  If one movement helps with the pain you can do it more often, and do it when your pain is increased    Exercises  - Seated Cervical Extension AROM  - 2-3 x daily - 7 x weekly - 1 sets - 10-20 reps  - Seated Cervical Sidebending AROM  - 2-3 x daily - 7 x weekly - 1 sets - 10-20 reps  - Seated Cervical Rotation AROM  - 2-3 x daily - 7 x weekly - 1 sets - 10-20  reps  - Seated Thoracic Lumbar Extension  - 2-3 x daily - 7 x weekly - 1 sets - 10-20 reps  - Seated Cervical Flexion AROM  - 2-3 x daily - 7 x weekly - 1 sets - 10-20 reps  - Seated Thoracic Flexion and Rotation with Arms Crossed  - 2-3 x daily - 7 x weekly - 10-20 reps    Access Code: [email or web address]  URL: [email or web address]  Date: 11/17/2023  Prepared by: Chapo Mosquera    Exercises  - Standing Shoulder Row with Anchored Resistance  - 1 x daily - 2-3 x weekly - 2-3 sets - 10-20 reps  - Shoulder extension with resistance - Neutral  - 1 x daily - 2-3 x weekly - 2-3 sets - 10-20 reps  - Shoulder Press on Swiss Ball  - 1 x daily - 2-3 x weekly - 2-3 sets - 10-20 reps  - Pec Fly Machine  - 1 x daily - 2-3 x weekly - 2-3 sets - 10-20 reps    Assessment:   Pt has continued to be complaint with HEP but has not had improvements in baselines.  Tolerated ROM in al directions well, only minimal increase in L periscap pain. [place] better leaving PT after soft tissue mobs. Pt demo'd understanding of changes to HEP and proper sx response to exercise.    Plan:  OP PT Plan  Certification Period Start Date: 10/13/23  Certification Period End Date: 04/10/24  Number of Treatments Authorized: 6/8    Goals:  Active       PT Problem       PT Goal 1       Start:  10/13/23    Expected End:  12/12/23       STG:  Pain = 0-3/10 neck/Left periscap by week 4  Pt will report good HEP compliance by week 2  LTG:   C/S and T/S  ROM = nil limit in all directions by week 6  NDI </= 2% by week 8  Pt will report >/= 80% improvement/progress towards PT goals by week 8  Pt will be able to lift heavy objects at work without increase/production of neck/periscap sx. For >/= 3 days a week by week 8               Chapo Mosquera, PT

## 2023-12-13 ENCOUNTER — APPOINTMENT (OUTPATIENT)
Dept: RADIOLOGY | Facility: HOSPITAL | Age: 23
End: 2023-12-13
Payer: COMMERCIAL

## 2023-12-13 ENCOUNTER — HOSPITAL ENCOUNTER (EMERGENCY)
Facility: HOSPITAL | Age: 23
Discharge: HOME | End: 2023-12-13
Attending: STUDENT IN AN ORGANIZED HEALTH CARE EDUCATION/TRAINING PROGRAM
Payer: COMMERCIAL

## 2023-12-13 VITALS
OXYGEN SATURATION: 99 % | DIASTOLIC BLOOD PRESSURE: 80 MMHG | HEART RATE: 82 BPM | RESPIRATION RATE: 16 BRPM | HEIGHT: 69 IN | BODY MASS INDEX: 22.92 KG/M2 | WEIGHT: 154.76 LBS | SYSTOLIC BLOOD PRESSURE: 125 MMHG | TEMPERATURE: 99 F

## 2023-12-13 DIAGNOSIS — R07.9 ACUTE CHEST PAIN: Primary | ICD-10-CM

## 2023-12-13 LAB
ANION GAP SERPL CALC-SCNC: 8 MMOL/L
BASOPHILS # BLD AUTO: 0.07 X10*3/UL (ref 0–0.1)
BASOPHILS NFR BLD AUTO: 0.9 %
BUN SERPL-MCNC: 10 MG/DL (ref 8–25)
CALCIUM SERPL-MCNC: 9.6 MG/DL (ref 8.5–10.4)
CHLORIDE SERPL-SCNC: 102 MMOL/L (ref 97–107)
CO2 SERPL-SCNC: 30 MMOL/L (ref 24–31)
CREAT SERPL-MCNC: 0.8 MG/DL (ref 0.4–1.6)
D DIMER PPP FEU-MCNC: <0.19 MG/L FEU (ref 0.19–0.5)
EOSINOPHIL # BLD AUTO: 0.16 X10*3/UL (ref 0–0.7)
EOSINOPHIL NFR BLD AUTO: 2 %
ERYTHROCYTE [DISTWIDTH] IN BLOOD BY AUTOMATED COUNT: 13 % (ref 11.5–14.5)
GFR SERPL CREATININE-BSD FRML MDRD: >90 ML/MIN/1.73M*2
GLUCOSE SERPL-MCNC: 92 MG/DL (ref 65–99)
HCT VFR BLD AUTO: 45.8 % (ref 41–52)
HGB BLD-MCNC: 15.1 G/DL (ref 13.5–17.5)
IMM GRANULOCYTES # BLD AUTO: 0.02 X10*3/UL (ref 0–0.7)
IMM GRANULOCYTES NFR BLD AUTO: 0.3 % (ref 0–0.9)
LYMPHOCYTES # BLD AUTO: 1.62 X10*3/UL (ref 1.2–4.8)
LYMPHOCYTES NFR BLD AUTO: 20.4 %
MCH RBC QN AUTO: 26.7 PG (ref 26–34)
MCHC RBC AUTO-ENTMCNC: 33 G/DL (ref 32–36)
MCV RBC AUTO: 81 FL (ref 80–100)
MONOCYTES # BLD AUTO: 0.54 X10*3/UL (ref 0.1–1)
MONOCYTES NFR BLD AUTO: 6.8 %
NEUTROPHILS # BLD AUTO: 5.52 X10*3/UL (ref 1.2–7.7)
NEUTROPHILS NFR BLD AUTO: 69.6 %
NRBC BLD-RTO: 0 /100 WBCS (ref 0–0)
PLATELET # BLD AUTO: 286 X10*3/UL (ref 150–450)
POTASSIUM SERPL-SCNC: 4 MMOL/L (ref 3.4–5.1)
RBC # BLD AUTO: 5.66 X10*6/UL (ref 4.5–5.9)
SODIUM SERPL-SCNC: 140 MMOL/L (ref 133–145)
TROPONIN T SERPL-MCNC: <6 NG/L
WBC # BLD AUTO: 7.9 X10*3/UL (ref 4.4–11.3)

## 2023-12-13 PROCEDURE — 36415 COLL VENOUS BLD VENIPUNCTURE: CPT | Performed by: STUDENT IN AN ORGANIZED HEALTH CARE EDUCATION/TRAINING PROGRAM

## 2023-12-13 PROCEDURE — 99284 EMERGENCY DEPT VISIT MOD MDM: CPT | Performed by: STUDENT IN AN ORGANIZED HEALTH CARE EDUCATION/TRAINING PROGRAM

## 2023-12-13 PROCEDURE — 71046 X-RAY EXAM CHEST 2 VIEWS: CPT | Mod: FY

## 2023-12-13 PROCEDURE — 85300 ANTITHROMBIN III ACTIVITY: CPT | Performed by: STUDENT IN AN ORGANIZED HEALTH CARE EDUCATION/TRAINING PROGRAM

## 2023-12-13 PROCEDURE — 99283 EMERGENCY DEPT VISIT LOW MDM: CPT | Mod: 25

## 2023-12-13 PROCEDURE — 80048 BASIC METABOLIC PNL TOTAL CA: CPT | Performed by: STUDENT IN AN ORGANIZED HEALTH CARE EDUCATION/TRAINING PROGRAM

## 2023-12-13 PROCEDURE — 85025 COMPLETE CBC W/AUTO DIFF WBC: CPT | Performed by: STUDENT IN AN ORGANIZED HEALTH CARE EDUCATION/TRAINING PROGRAM

## 2023-12-13 PROCEDURE — 84484 ASSAY OF TROPONIN QUANT: CPT | Performed by: STUDENT IN AN ORGANIZED HEALTH CARE EDUCATION/TRAINING PROGRAM

## 2023-12-13 ASSESSMENT — COLUMBIA-SUICIDE SEVERITY RATING SCALE - C-SSRS
2. HAVE YOU ACTUALLY HAD ANY THOUGHTS OF KILLING YOURSELF?: NO
1. IN THE PAST MONTH, HAVE YOU WISHED YOU WERE DEAD OR WISHED YOU COULD GO TO SLEEP AND NOT WAKE UP?: NO
6. HAVE YOU EVER DONE ANYTHING, STARTED TO DO ANYTHING, OR PREPARED TO DO ANYTHING TO END YOUR LIFE?: NO

## 2023-12-13 ASSESSMENT — PAIN SCALES - GENERAL: PAINLEVEL_OUTOF10: 5 - MODERATE PAIN

## 2023-12-13 ASSESSMENT — PAIN DESCRIPTION - LOCATION: LOCATION: CHEST

## 2023-12-13 ASSESSMENT — PAIN - FUNCTIONAL ASSESSMENT: PAIN_FUNCTIONAL_ASSESSMENT: 0-10

## 2023-12-13 NOTE — Clinical Note
James Johnson was seen and treated in our emergency department on 12/13/2023.  He may return to work on 12/14/2023.       If you have any questions or concerns, please don't hesitate to call.      Renu Benavides MD

## 2023-12-13 NOTE — DISCHARGE INSTRUCTIONS
You are seen in the emergency department today for chest pain.  At this time, your labs and chest x-ray are reassuring.  You do not need to stay in the hospital.  Please follow-up with your primary care doctor as needed.

## 2023-12-22 ENCOUNTER — APPOINTMENT (OUTPATIENT)
Dept: PHYSICAL THERAPY | Facility: CLINIC | Age: 23
End: 2023-12-22
Payer: COMMERCIAL

## 2024-01-03 ENCOUNTER — HOSPITAL ENCOUNTER (OUTPATIENT)
Dept: CARDIOLOGY | Facility: HOSPITAL | Age: 24
Discharge: HOME | End: 2024-01-03
Payer: COMMERCIAL

## 2024-01-03 PROCEDURE — 93005 ELECTROCARDIOGRAM TRACING: CPT

## 2024-01-04 LAB
ATRIAL RATE: 89 BPM
P AXIS: 79 DEGREES
P OFFSET: 205 MS
P ONSET: 150 MS
PR INTERVAL: 140 MS
Q ONSET: 220 MS
QRS COUNT: 14 BEATS
QRS DURATION: 88 MS
QT INTERVAL: 330 MS
QTC CALCULATION(BAZETT): 401 MS
QTC FREDERICIA: 376 MS
R AXIS: 42 DEGREES
T AXIS: 71 DEGREES
T OFFSET: 385 MS
VENTRICULAR RATE: 89 BPM

## 2024-01-11 ENCOUNTER — PROCEDURE VISIT (OUTPATIENT)
Dept: SURGERY | Facility: CLINIC | Age: 24
End: 2024-01-11
Payer: COMMERCIAL

## 2024-01-11 VITALS
BODY MASS INDEX: 22.66 KG/M2 | SYSTOLIC BLOOD PRESSURE: 110 MMHG | OXYGEN SATURATION: 98 % | HEART RATE: 103 BPM | DIASTOLIC BLOOD PRESSURE: 66 MMHG | WEIGHT: 153 LBS | TEMPERATURE: 97.8 F | HEIGHT: 69 IN

## 2024-01-11 DIAGNOSIS — K60.2 ANAL FISSURE: Primary | ICD-10-CM

## 2024-01-11 PROCEDURE — 46600 DIAGNOSTIC ANOSCOPY SPX: CPT | Performed by: SURGERY

## 2024-01-11 PROCEDURE — 99213 OFFICE O/P EST LOW 20 MIN: CPT | Performed by: SURGERY

## 2024-01-11 ASSESSMENT — ENCOUNTER SYMPTOMS
ABDOMINAL DISTENTION: 0
NERVOUS/ANXIOUS: 0
BLOOD IN STOOL: 0
SORE THROAT: 0
CHEST TIGHTNESS: 0
RECTAL PAIN: 1
DIFFICULTY URINATING: 0
ABDOMINAL PAIN: 0
COLOR CHANGE: 0
SHORTNESS OF BREATH: 0
NECK PAIN: 0
DIARRHEA: 0
FACIAL SWELLING: 0
NAUSEA: 0
TROUBLE SWALLOWING: 0
LIGHT-HEADEDNESS: 0
EYE REDNESS: 0
ANAL BLEEDING: 1
CHILLS: 0
FEVER: 0
BRUISES/BLEEDS EASILY: 0
ADENOPATHY: 0
CONSTIPATION: 0
CONFUSION: 0
WOUND: 0
BACK PAIN: 0
RECTAL BLEEDING: 1
WEAKNESS: 0
VOMITING: 0
SPEECH DIFFICULTY: 0

## 2024-01-11 ASSESSMENT — PAIN SCALES - GENERAL: PAINLEVEL: 0-NO PAIN

## 2024-01-11 NOTE — ASSESSMENT & PLAN NOTE
Nitro-Bid ointment sent to his pharmacy.  Instructed to apply twice per day.  Follow-up in 6 weeks.

## 2024-01-11 NOTE — PROGRESS NOTES
Patient ID: James Johnson is a 23 y.o. male.Subjective   Patient ID: James Johnson is a 23 y.o. male who presents for Hemorrhoids (S/p hemorrhoid banding 10/12/2023/Symptoms improved until mid December/Currently experiencing pain with bm and bleeding/Denies diarrhea/constipation ).  Was doing well for over 2 months after banding of a hemorrhoid in the fall, but developed some pain and bleeding over the last week.  He cannot pinpoint the area of pain or discomfort.  It follows bowel movements and has a burning stinging nature.    He states that he has been compliant with daily psyllium fiber supplementation and spending less than 1 to 2 minutes on the toilet at a time.  He does not report any changes in bowel habits.        Review of Systems   Constitutional:  Negative for chills and fever.   HENT:  Negative for facial swelling, sore throat and trouble swallowing.    Eyes:  Negative for redness and visual disturbance.   Respiratory:  Negative for chest tightness and shortness of breath.    Cardiovascular:  Negative for chest pain and leg swelling.   Gastrointestinal:  Positive for anal bleeding and rectal pain. Negative for abdominal distention, abdominal pain, blood in stool, constipation, diarrhea, nausea and vomiting.   Endocrine: Negative for cold intolerance and heat intolerance.   Genitourinary:  Negative for difficulty urinating and enuresis.   Musculoskeletal:  Negative for back pain, gait problem and neck pain.   Skin:  Negative for color change, rash and wound.   Allergic/Immunologic: Negative for immunocompromised state.   Neurological:  Negative for speech difficulty, weakness and light-headedness.   Hematological:  Negative for adenopathy. Does not bruise/bleed easily.   Psychiatric/Behavioral:  Negative for confusion. The patient is not nervous/anxious.        Objective   Physical Exam  Constitutional:       General: He is not in acute distress.     Appearance: He is not toxic-appearing.    HENT:      Head: Normocephalic and atraumatic.      Mouth/Throat:      Mouth: Mucous membranes are moist.      Pharynx: Oropharynx is clear.   Eyes:      General: No scleral icterus.     Extraocular Movements: Extraocular movements intact.      Pupils: Pupils are equal, round, and reactive to light.   Cardiovascular:      Rate and Rhythm: Normal rate and regular rhythm.   Pulmonary:      Effort: Pulmonary effort is normal.      Breath sounds: Normal breath sounds.   Abdominal:      General: There is no distension.      Palpations: Abdomen is soft. There is no mass.      Tenderness: There is no abdominal tenderness. There is no guarding.      Hernia: No hernia is present.   Genitourinary:     Comments: Anoscopy shows a very small, less than 6 mm in size posterior fissure without active bleeding at the time of examination.  Musculoskeletal:         General: No swelling. Normal range of motion.      Cervical back: Normal range of motion.   Skin:     General: Skin is warm and dry.      Coloration: Skin is not jaundiced.   Neurological:      General: No focal deficit present.      Mental Status: He is alert and oriented to person, place, and time.   Psychiatric:         Mood and Affect: Mood normal.         Behavior: Behavior normal.         Assessment/Plan   Problem List Items Addressed This Visit             ICD-10-CM    Anal fissure - Primary K60.2     Nitro-Bid ointment sent to his pharmacy.  Instructed to apply twice per day.  Follow-up in 6 weeks.         Relevant Medications    nitroglycerin (Abraham-Bid) 2 % ointment            Josue Jimenez MD 01/11/24 9:10 AM     Anoscopy    Date/Time: 1/11/2024 9:09 AM    Performed by: Josue Jimenez MD  Authorized by: Josue Jimenez MD    Consent:     Consent obtained:  Written    Consent given by:  Patient    Risks, benefits, and alternatives were discussed: yes      Risks discussed:  Bleeding and pain    Alternatives discussed:  No treatment, alternative treatment and  observation  Universal protocol:     Procedure explained and questions answered to patient or proxy's satisfaction: yes      Relevant documents present and verified: yes      Test results available: yes      Imaging studies available: yes      Required blood products, implants, devices, and special equipment available: yes      Site/side marked: yes      Patient identity confirmed:  Verbally with patient  Indications:     Indications: rectal bleeding and rectal pain    Procedure details:     Internal hemorrhoids: no      Inflammation: no      Anal fissures: yes      Anal fissure position:  Twelve o'clock    Anal fistulae: no      Anal stricture: no      Abscess: no      Tearing: no      Blood in rectal vault: no    Post-procedure details:     Procedure completion:  Tolerated well, no immediate complications

## 2024-01-16 ENCOUNTER — APPOINTMENT (OUTPATIENT)
Dept: DERMATOLOGY | Facility: CLINIC | Age: 24
End: 2024-01-16
Payer: COMMERCIAL

## 2024-01-17 ENCOUNTER — OFFICE VISIT (OUTPATIENT)
Dept: DERMATOLOGY | Facility: CLINIC | Age: 24
End: 2024-01-17
Payer: COMMERCIAL

## 2024-01-17 DIAGNOSIS — L20.9 ATOPIC DERMATITIS AND RELATED CONDITION: Primary | ICD-10-CM

## 2024-01-17 PROCEDURE — 1036F TOBACCO NON-USER: CPT | Performed by: NURSE PRACTITIONER

## 2024-01-17 PROCEDURE — 99213 OFFICE O/P EST LOW 20 MIN: CPT | Performed by: NURSE PRACTITIONER

## 2024-01-17 ASSESSMENT — DERMATOLOGY QUALITY OF LIFE (QOL) ASSESSMENT
RATE HOW BOTHERED YOU ARE BY EFFECTS OF YOUR SKIN PROBLEMS ON YOUR ACTIVITIES (EG, GOING OUT, ACCOMPLISHING WHAT YOU WANT, WORK ACTIVITIES OR YOUR RELATIONSHIPS WITH OTHERS): 0 - NEVER BOTHERED
RATE HOW BOTHERED YOU ARE BY SYMPTOMS OF YOUR SKIN PROBLEM (EG, ITCHING, STINGING BURNING, HURTING OR SKIN IRRITATION): 1
RATE HOW EMOTIONALLY BOTHERED YOU ARE BY YOUR SKIN PROBLEM (FOR EXAMPLE, WORRY, EMBARRASSMENT, FRUSTRATION): 0 - NEVER BOTHERED
WHAT SINGLE SKIN CONDITION LISTED BELOW IS THE PATIENT ANSWERING THE QUALITY-OF-LIFE ASSESSMENT QUESTIONS ABOUT: DERMATITIS

## 2024-01-17 ASSESSMENT — PHYSICIAN GLOBAL ASSESSMENT (PGA): WHAT IS THE PGA: PHYSICIAN GLOBAL ASSESSMENT:  1 - MINIMAL

## 2024-01-17 ASSESSMENT — PATIENT GLOBAL ASSESSMENT (PGA): PATIENT GLOBAL ASSESSMENT: PATIENT GLOBAL ASSESSMENT:  2 - MILD

## 2024-01-17 ASSESSMENT — BODY SURFACE AREA (BSA): WHAT IS THE BSA PERCENTAGE: < 3% BODY SURFACE AREA INVOLVED

## 2024-01-17 ASSESSMENT — ITCH NUMERIC RATING SCALE: HOW SEVERE IS YOUR ITCHING?: 0

## 2024-01-17 NOTE — PROGRESS NOTES
Subjective     James Johnson is a 23 y.o. male who presents for the following: Eczema.     Review of Systems:  No other skin or systemic complaints other than what is documented elsewhere in the note.    The following portions of the chart were reviewed this encounter and updated as appropriate:   Tobacco  Allergies  Meds  Med Hx  Surg Hx  Fam Hx  Soc Hx        Skin Cancer History  No skin cancer on file.      Specialty Problems          Dermatology Problems    Atopic dermatitis, unspecified    Tinea cruris        Objective   Well appearing patient in no apparent distress; mood and affect are within normal limits.    A focused skin examination was performed, groin area noted examined. . All findings within normal limits unless otherwise noted below.    Assessment/Plan   1. Atopic dermatitis and related condition  Left Ankle - Anterior, Left Hand - Posterior, Left Upper Arm - Anterior, Pubic, Right Ankle - Anterior, Right Antecubital Fossa, Right Hand - Posterior, Right Upper Arm - Anterior  A few small erythematous semi scaly macules on the right dorsal hand only    This is a 23 y.o. male  following up for atopic derm. Has not used eucrisa this past year. 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 If he misses a couple of days, his hands break out. Has eucrisa available if needed. Advised to follow up on an as needed basis given excellent control unless worsening or eucrisa no longer controlling. Continue with eucrisa BID PRN for flares.               Return in 6 months for routine skin check or return to clinic sooner if needed

## 2024-01-22 ENCOUNTER — APPOINTMENT (OUTPATIENT)
Dept: PRIMARY CARE | Facility: CLINIC | Age: 24
End: 2024-01-22
Payer: COMMERCIAL

## 2024-01-24 ENCOUNTER — OFFICE VISIT (OUTPATIENT)
Dept: PRIMARY CARE | Facility: CLINIC | Age: 24
End: 2024-01-24
Payer: COMMERCIAL

## 2024-01-24 VITALS
DIASTOLIC BLOOD PRESSURE: 60 MMHG | HEART RATE: 88 BPM | OXYGEN SATURATION: 98 % | BODY MASS INDEX: 24.91 KG/M2 | WEIGHT: 155 LBS | HEIGHT: 66 IN | TEMPERATURE: 98.2 F | SYSTOLIC BLOOD PRESSURE: 106 MMHG

## 2024-01-24 DIAGNOSIS — M54.6 CHRONIC BILATERAL THORACIC BACK PAIN: Primary | ICD-10-CM

## 2024-01-24 DIAGNOSIS — G89.29 CHRONIC BILATERAL THORACIC BACK PAIN: Primary | ICD-10-CM

## 2024-01-24 PROCEDURE — 99214 OFFICE O/P EST MOD 30 MIN: CPT | Performed by: INTERNAL MEDICINE

## 2024-01-24 PROCEDURE — 1036F TOBACCO NON-USER: CPT | Performed by: INTERNAL MEDICINE

## 2024-01-24 RX ORDER — METHYLPREDNISOLONE 4 MG/1
TABLET ORAL
Qty: 21 TABLET | Refills: 0 | Status: SHIPPED | OUTPATIENT
Start: 2024-01-24 | End: 2024-02-01

## 2024-01-24 ASSESSMENT — PATIENT HEALTH QUESTIONNAIRE - PHQ9
1. LITTLE INTEREST OR PLEASURE IN DOING THINGS: NOT AT ALL
2. FEELING DOWN, DEPRESSED OR HOPELESS: NOT AT ALL
SUM OF ALL RESPONSES TO PHQ9 QUESTIONS 1 AND 2: 0

## 2024-01-24 ASSESSMENT — LIFESTYLE VARIABLES: HOW OFTEN DO YOU HAVE A DRINK CONTAINING ALCOHOL: 2-4 TIMES A MONTH

## 2024-01-24 ASSESSMENT — ENCOUNTER SYMPTOMS
SHORTNESS OF BREATH: 0
BLOOD IN STOOL: 0
PALPITATIONS: 0
CONSTIPATION: 0
JOINT SWELLING: 0
TREMORS: 0
ABDOMINAL PAIN: 0
WHEEZING: 0
POLYDIPSIA: 0
WEAKNESS: 0
COUGH: 0
POLYPHAGIA: 0
HEMATURIA: 0
LOSS OF SENSATION IN FEET: 0
BACK PAIN: 1
SORE THROAT: 0
SINUS PAIN: 0
DEPRESSION: 0
ARTHRALGIAS: 0
FATIGUE: 0
OCCASIONAL FEELINGS OF UNSTEADINESS: 0
NERVOUS/ANXIOUS: 0
NUMBNESS: 0
UNEXPECTED WEIGHT CHANGE: 0

## 2024-01-24 ASSESSMENT — PAIN SCALES - GENERAL: PAINLEVEL: 3

## 2024-01-24 NOTE — PROGRESS NOTES
"Subjective   Patient ID: James Johnson is a 23 y.o. male who presents for Follow-up (Physical therapy follow-back pain).    HPI     Patient is complaining of upper back, neck pain radiating towards back of lower head since last few months Pain is 5-6/10.  Pain radiates bilaterally towards underneath the shoulder blades, has associated tingling in the middle of the spine. Has pain during the week when he lifts heavy stuff,  more than 25 pounds at work.  Pain improves during weekend when he rests.  Ibuprofen 400 mg helps a little.  Naprosyn caused stomach pain, and had no relief with cyclobenzaprine    No relief with physical therapy    Review of Systems   Constitutional:  Negative for fatigue and unexpected weight change.   HENT:  Negative for congestion, ear pain, sinus pain and sore throat.    Respiratory:  Negative for cough, shortness of breath and wheezing.    Cardiovascular:  Negative for chest pain, palpitations and leg swelling.   Gastrointestinal:  Negative for abdominal pain, blood in stool and constipation.   Endocrine: Negative for polydipsia, polyphagia and polyuria.   Genitourinary:  Negative for hematuria and urgency.   Musculoskeletal:  Positive for back pain (Upper back pain). Negative for arthralgias and joint swelling.   Skin:  Negative for rash.   Neurological:  Negative for tremors, syncope, weakness and numbness.        Occasional tingling in the middle of spine   Psychiatric/Behavioral:  Negative for behavioral problems. The patient is not nervous/anxious.        Objective   /60 (BP Location: Left arm, Patient Position: Sitting, BP Cuff Size: Adult)   Pulse 88   Temp 36.8 °C (98.2 °F) (Temporal)   Ht 1.676 m (5' 6\")   Wt 70.3 kg (155 lb)   SpO2 98%   BMI 25.02 kg/m²     Physical Exam  Constitutional:       Appearance: Normal appearance.   HENT:      Head: Normocephalic and atraumatic.   Eyes:      Extraocular Movements: Extraocular movements intact.   Cardiovascular:      " Rate and Rhythm: Normal rate and regular rhythm.      Pulses: Normal pulses.      Heart sounds: Normal heart sounds.   Pulmonary:      Effort: Pulmonary effort is normal.      Breath sounds: Normal breath sounds.   Abdominal:      General: Abdomen is flat. Bowel sounds are normal.   Musculoskeletal:      Thoracic back: No deformity, spasms, tenderness or bony tenderness. Normal range of motion.      Comments: Mild tenderness on palpation bilaterally underneath the shoulder blades, no swelling   Neurological:      Mental Status: He is alert.         Assessment/Plan       James was seen today for follow-up.  Diagnoses and all orders for this visit:  Chronic bilateral thoracic back pain (Primary)  -     MR thoracic spine wo IV contrast; Future  -     Referral to Pain Medicine; Future  -     methylPREDNISolone (Medrol Dospak) 4 mg tablets; Take as directed on package.       Reviewed x-ray thoracic spine

## 2024-01-29 ENCOUNTER — TELEPHONE (OUTPATIENT)
Dept: PRIMARY CARE | Facility: CLINIC | Age: 24
End: 2024-01-29

## 2024-02-01 ENCOUNTER — HOSPITAL ENCOUNTER (OUTPATIENT)
Dept: RADIOLOGY | Facility: CLINIC | Age: 24
Discharge: HOME | End: 2024-02-01
Payer: COMMERCIAL

## 2024-02-01 ENCOUNTER — OFFICE VISIT (OUTPATIENT)
Dept: PRIMARY CARE | Facility: CLINIC | Age: 24
End: 2024-02-01
Payer: COMMERCIAL

## 2024-02-01 VITALS
HEIGHT: 69 IN | TEMPERATURE: 98 F | WEIGHT: 154 LBS | OXYGEN SATURATION: 97 % | BODY MASS INDEX: 22.81 KG/M2 | HEART RATE: 102 BPM | SYSTOLIC BLOOD PRESSURE: 128 MMHG | DIASTOLIC BLOOD PRESSURE: 68 MMHG

## 2024-02-01 DIAGNOSIS — M54.50 ACUTE LEFT-SIDED LOW BACK PAIN WITHOUT SCIATICA: ICD-10-CM

## 2024-02-01 DIAGNOSIS — M54.6 CHRONIC BILATERAL THORACIC BACK PAIN: Primary | ICD-10-CM

## 2024-02-01 DIAGNOSIS — G89.29 CHRONIC BILATERAL THORACIC BACK PAIN: Primary | ICD-10-CM

## 2024-02-01 DIAGNOSIS — M25.511 ACUTE PAIN OF RIGHT SHOULDER: ICD-10-CM

## 2024-02-01 PROCEDURE — 72110 X-RAY EXAM L-2 SPINE 4/>VWS: CPT | Performed by: STUDENT IN AN ORGANIZED HEALTH CARE EDUCATION/TRAINING PROGRAM

## 2024-02-01 PROCEDURE — 99213 OFFICE O/P EST LOW 20 MIN: CPT | Performed by: INTERNAL MEDICINE

## 2024-02-01 PROCEDURE — 72110 X-RAY EXAM L-2 SPINE 4/>VWS: CPT

## 2024-02-01 PROCEDURE — 73030 X-RAY EXAM OF SHOULDER: CPT | Mod: RT

## 2024-02-01 PROCEDURE — 1036F TOBACCO NON-USER: CPT | Performed by: INTERNAL MEDICINE

## 2024-02-01 PROCEDURE — 73030 X-RAY EXAM OF SHOULDER: CPT | Mod: RIGHT SIDE | Performed by: STUDENT IN AN ORGANIZED HEALTH CARE EDUCATION/TRAINING PROGRAM

## 2024-02-01 ASSESSMENT — PAIN SCALES - GENERAL: PAINLEVEL: 8

## 2024-02-01 ASSESSMENT — COLUMBIA-SUICIDE SEVERITY RATING SCALE - C-SSRS
2. HAVE YOU ACTUALLY HAD ANY THOUGHTS OF KILLING YOURSELF?: NO
6. HAVE YOU EVER DONE ANYTHING, STARTED TO DO ANYTHING, OR PREPARED TO DO ANYTHING TO END YOUR LIFE?: NO
1. IN THE PAST MONTH, HAVE YOU WISHED YOU WERE DEAD OR WISHED YOU COULD GO TO SLEEP AND NOT WAKE UP?: NO

## 2024-02-01 ASSESSMENT — ENCOUNTER SYMPTOMS
PALPITATIONS: 0
OCCASIONAL FEELINGS OF UNSTEADINESS: 0
UNEXPECTED WEIGHT CHANGE: 0
SORE THROAT: 0
LOSS OF SENSATION IN FEET: 0
DEPRESSION: 0
SHORTNESS OF BREATH: 0
FATIGUE: 0
WHEEZING: 0
BACK PAIN: 1
TREMORS: 0
POLYDIPSIA: 0
CONSTIPATION: 0
WEAKNESS: 0
NUMBNESS: 0
COUGH: 0
ABDOMINAL PAIN: 0
POLYPHAGIA: 0
JOINT SWELLING: 0
BLOOD IN STOOL: 0
HEMATURIA: 0
ARTHRALGIAS: 1
SINUS PAIN: 0
NERVOUS/ANXIOUS: 0

## 2024-02-01 ASSESSMENT — PATIENT HEALTH QUESTIONNAIRE - PHQ9
2. FEELING DOWN, DEPRESSED OR HOPELESS: NOT AT ALL
SUM OF ALL RESPONSES TO PHQ9 QUESTIONS 1 AND 2: 0
1. LITTLE INTEREST OR PLEASURE IN DOING THINGS: NOT AT ALL

## 2024-02-01 NOTE — PROGRESS NOTES
"Subjective   Patient ID: James Johnson is a 23 y.o. male who presents for Discuss MRI (Tail bone, upper arm and neck pain).    HPI     Complaining of right shoulder pain and low back pain since last few days.   He tried to turn to pick something up from floor, since then has left sided back pain, since last 2 weeks  Has right sided shoulder pain for past 4 weeks, unable to do repetitive motion at work, especially pushing and pulling due to pain. Started Medrol dose Cam yesterday, so far no relief. Pain worse in the morning, pain is 7/10.  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   HENT:  Negative for congestion, ear pain, sinus pain and sore throat.    Respiratory:  Negative for cough, shortness of breath and wheezing.    Cardiovascular:  Negative for chest pain, palpitations and leg swelling.   Gastrointestinal:  Negative for abdominal pain, blood in stool and constipation.   Endocrine: Negative for polydipsia, polyphagia and polyuria.   Genitourinary:  Negative for hematuria and urgency.   Musculoskeletal:  Positive for arthralgias (Bilateral shoulder right is worse than left) and back pain (Upper and lower back pain). Negative for joint swelling.   Skin:  Negative for rash.   Neurological:  Negative for tremors, syncope, weakness and numbness.        Occasional tingling in the middle of spine   Psychiatric/Behavioral:  Negative for behavioral problems. The patient is not nervous/anxious.        Objective   /68 (BP Location: Left arm, Patient Position: Sitting, BP Cuff Size: Small adult)   Pulse 102   Temp 36.7 °C (98 °F) (Temporal)   Ht 1.753 m (5' 9\")   Wt 69.9 kg (154 lb)   SpO2 97%   BMI 22.74 kg/m²     Physical Exam  Constitutional:       General: He is not in acute " distress.     Appearance: Normal appearance.   HENT:      Head: Normocephalic and atraumatic.   Eyes:      Extraocular Movements: Extraocular movements intact.   Cardiovascular:      Rate and Rhythm: Normal rate and regular rhythm.      Pulses: Normal pulses.      Heart sounds: Normal heart sounds.   Pulmonary:      Effort: Pulmonary effort is normal.      Breath sounds: Normal breath sounds.   Abdominal:      General: Abdomen is flat. Bowel sounds are normal.   Musculoskeletal:      Thoracic back: No deformity, spasms, tenderness or bony tenderness. Normal range of motion.      Right lower leg: No edema.      Left lower leg: No edema.      Comments: Mild tenderness on palpation bilaterally underneath the shoulder blades, no swelling  Range of motion of right shoulder normal, drop arm sign negative  No low back tenderness, SLR negative   Neurological:      Mental Status: He is alert and oriented to person, place, and time.      Cranial Nerves: No cranial nerve deficit.         Assessment/Plan        James was seen today for discuss mri.  Diagnoses and all orders for this visit:  Chronic bilateral thoracic back pain (Primary)  -     Referral to Sports Medicine; Future  Acute pain of right shoulder  -     XR shoulder right 2+ views; Future  -     Referral to Sports Medicine; Future  Acute left-sided low back pain without sciatica  -     XR lumbar spine 2-3 views; Future  -     Referral to Sports Medicine; Future     Advised to use Medrol Dosepak as prescribed  Could not tolerate methocarbamol due to dizziness      Current Outpatient Medications   Medication Instructions    methylPREDNISolone (Medrol Dospak) 4 mg tablets Take as directed on package.    nitroglycerin (Abraham-Bid) 2 % ointment Apply a pea-sized amount to the top of a Q-tip.  Apply inside your anus twice per day.

## 2024-02-05 ENCOUNTER — OFFICE VISIT (OUTPATIENT)
Dept: PAIN MEDICINE | Facility: CLINIC | Age: 24
End: 2024-02-05
Payer: COMMERCIAL

## 2024-02-05 VITALS
BODY MASS INDEX: 22.81 KG/M2 | WEIGHT: 154 LBS | HEART RATE: 74 BPM | SYSTOLIC BLOOD PRESSURE: 122 MMHG | DIASTOLIC BLOOD PRESSURE: 84 MMHG | HEIGHT: 69 IN

## 2024-02-05 DIAGNOSIS — M25.511 ACUTE PAIN OF RIGHT SHOULDER: Primary | ICD-10-CM

## 2024-02-05 DIAGNOSIS — M47.814 THORACIC SPONDYLOSIS WITHOUT MYELOPATHY: ICD-10-CM

## 2024-02-05 DIAGNOSIS — M47.817 LUMBOSACRAL SPONDYLOSIS WITHOUT MYELOPATHY: ICD-10-CM

## 2024-02-05 DIAGNOSIS — M54.14 THORACIC RADICULOPATHY: ICD-10-CM

## 2024-02-05 PROBLEM — M54.9 BACK PAIN: Status: ACTIVE | Noted: 2023-07-27

## 2024-02-05 PROBLEM — L21.9 SEBORRHEIC DERMATITIS: Status: ACTIVE | Noted: 2024-02-05

## 2024-02-05 PROBLEM — K64.8 BLEEDING INTERNAL HEMORRHOIDS: Status: ACTIVE | Noted: 2023-05-24

## 2024-02-05 PROBLEM — K63.89 OTHER SPECIFIED DISEASES OF INTESTINE: Status: ACTIVE | Noted: 2023-05-24

## 2024-02-05 PROBLEM — Z91.040 LATEX ALLERGY STATUS: Status: ACTIVE | Noted: 2023-05-24

## 2024-02-05 PROBLEM — K57.32 DIVERTICULITIS OF SIGMOID COLON: Status: ACTIVE | Noted: 2023-05-09

## 2024-02-05 PROBLEM — L20.9 ATOPIC DERMATITIS: Status: ACTIVE | Noted: 2020-11-12

## 2024-02-05 PROBLEM — L29.0 PRURITUS ANI: Status: ACTIVE | Noted: 2024-01-11

## 2024-02-05 PROBLEM — R12 HEARTBURN: Status: ACTIVE | Noted: 2023-05-24

## 2024-02-05 PROBLEM — J35.1: Status: ACTIVE | Noted: 2024-02-05

## 2024-02-05 PROBLEM — R07.9 CHEST PAIN: Status: ACTIVE | Noted: 2022-09-26

## 2024-02-05 PROBLEM — K31.89 OTHER DISEASES OF STOMACH AND DUODENUM: Status: ACTIVE | Noted: 2023-05-24

## 2024-02-05 PROBLEM — R59.0 LOCALIZED ENLARGED LYMPH NODES: Status: ACTIVE | Noted: 2023-05-24

## 2024-02-05 PROBLEM — R10.32 LEFT LOWER QUADRANT PAIN: Status: ACTIVE | Noted: 2022-09-26

## 2024-02-05 PROBLEM — K29.50 CHRONIC GASTRITIS: Status: ACTIVE | Noted: 2023-05-24

## 2024-02-05 PROCEDURE — 99204 OFFICE O/P NEW MOD 45 MIN: CPT | Performed by: ANESTHESIOLOGY

## 2024-02-05 PROCEDURE — 99214 OFFICE O/P EST MOD 30 MIN: CPT | Performed by: ANESTHESIOLOGY

## 2024-02-05 PROCEDURE — 1036F TOBACCO NON-USER: CPT | Performed by: ANESTHESIOLOGY

## 2024-02-05 RX ORDER — DICYCLOMINE HYDROCHLORIDE 20 MG/1
20 TABLET ORAL EVERY 8 HOURS
COMMUNITY
Start: 2023-05-12 | End: 2024-05-24 | Stop reason: ALTCHOICE

## 2024-02-05 ASSESSMENT — ENCOUNTER SYMPTOMS
BACK PAIN: 1
ARTHRALGIAS: 1
ACTIVITY CHANGE: 1

## 2024-02-05 ASSESSMENT — PAIN SCALES - GENERAL: PAINLEVEL_OUTOF10: 6

## 2024-02-05 ASSESSMENT — PATIENT HEALTH QUESTIONNAIRE - PHQ9
SUM OF ALL RESPONSES TO PHQ9 QUESTIONS 1 AND 2: 0
2. FEELING DOWN, DEPRESSED OR HOPELESS: NOT AT ALL
1. LITTLE INTEREST OR PLEASURE IN DOING THINGS: NOT AT ALL

## 2024-02-05 ASSESSMENT — PAIN DESCRIPTION - DESCRIPTORS: DESCRIPTORS: ACHING;SHARP

## 2024-02-05 ASSESSMENT — PAIN - FUNCTIONAL ASSESSMENT: PAIN_FUNCTIONAL_ASSESSMENT: 0-10

## 2024-02-05 ASSESSMENT — LIFESTYLE VARIABLES: TOTAL SCORE: 1

## 2024-02-05 NOTE — TELEPHONE ENCOUNTER
Patient LVM on 2/2/2024 at 3:11pm stating that he sees his xray results and wants to know if he should come back in and see you or make an appt with Pain Management.  Please call him back at [phone]  
Sent prednisone as he had no relief with NSAIDs, muscle relaxants, Tylenol previously.  Taking prednisone for short term will not cause diabetes.  Cannot order MRI of different body parts as requested, as I did not see him or evaluated for pain in other areas.
Detail Level: Detailed
Quality 226: Preventive Care And Screening: Tobacco Use: Screening And Cessation Intervention: Patient screened for tobacco use and is an ex/non-smoker
Quality 130: Documentation Of Current Medications In The Medical Record: Current Medications Documented
Quality 431: Preventive Care And Screening: Unhealthy Alcohol Use - Screening: Patient identified as an unhealthy alcohol user when screened for unhealthy alcohol use using a systematic screening method and received brief counseling

## 2024-02-05 NOTE — PROGRESS NOTES
The patient is a 23-year-old male with back pain.  He has pain in both the mid and low back.  The mid back pain radiates from a spot between the scapula around to the front occasionally.  The pain is worse with activity.  He gets some relief with rest.  He is scheduled to have a thoracic spine MRI later this week.  Plain films of the thoracic spine showed dextroscoliosis.  He also describes right shoulder and low back pain that started a few weeks ago.  The back pain is at its worst when he is sitting for long periods of time, lying on his back, and rising from the seated position.  He denies weakness.  He denies numbness and tingling.  He denies fevers chills nausea vomiting.  He denies incontinence of bowel and bladder.  The right shoulder pain started in the last few weeks.  The pain is interfering with his ability to work.  He has left shoulder pain that he tolerates.  This pain started approximately 9 months ago.  He has participated in physical therapy for his left shoulder.  The physical therapy did not seem effective.  He is taking a methylprednisolone therapy pack.  This is providing some relief.    Review of Systems   Constitutional:  Positive for activity change.   Musculoskeletal:  Positive for arthralgias and back pain.   All other systems reviewed and are negative.    GENERAL: alert and appropriate, in no distress, well-hydrated, well nourished, interactive         SKIN: no rash noted         HEAD: normocephalic, no abnormality or lesion noted         EYES: no injection and visual acuity is grossly normal         EARS: external ears normal, no mastoid tenderness         NOSE: external nose normal without rhinorrhea         OROPHARYNX: moist mucus membranes, no tonsillar hypertrophy/exudate, uvula midline and pharynx non-erythematous, lips, teeth and gums are without obvious lesion         NECK: Adequate ROM, no cervical LNs noted         RESPIRATORY: breathing non-labored and no  grunting/flaring/retractions         CHEST: equal chest rise with normal respiratory effort         ABDOMEN: soft and non-tender         BACK: back normal in appearance, lumbar spine with reduced ROM         EXTREMITIES: strength intact         NEUROLOGIC: gait antalgic, SLR positive, sensation grossly intact    Assessment and Plan    -Chronicity--acute on chronic musculoskeletal pain    -Diagnostics--I encouraged the patient to have the thoracic spine MRI as scheduled    -Pharmacologic--continue methylprednisolone therapy pack    -Psychologic--no need for psychologic intervention from my standpoint    -Physical--we discussed the importance of physical therapy and exercise.  We discussed avoidance and modification techniques.  I would like the patient consultation with Dr. Vila for chiropractic treatment.    -Intervention--we discussed the possibility of diagnostic facet medial nerve branch blocks to address his arthritic low back pain in the future if necessary    I spent time educating the patient on the condition including the treatment and the prognosis.  I invited the patient to call at anytime with any questions.

## 2024-02-07 ENCOUNTER — HOSPITAL ENCOUNTER (OUTPATIENT)
Dept: RADIOLOGY | Facility: CLINIC | Age: 24
Discharge: HOME | End: 2024-02-07
Payer: COMMERCIAL

## 2024-02-07 DIAGNOSIS — G89.29 CHRONIC BILATERAL THORACIC BACK PAIN: ICD-10-CM

## 2024-02-07 DIAGNOSIS — M54.6 CHRONIC BILATERAL THORACIC BACK PAIN: ICD-10-CM

## 2024-02-07 PROCEDURE — 72146 MRI CHEST SPINE W/O DYE: CPT

## 2024-02-07 PROCEDURE — 72146 MRI CHEST SPINE W/O DYE: CPT | Performed by: RADIOLOGY

## 2024-02-09 ENCOUNTER — HOSPITAL ENCOUNTER (EMERGENCY)
Facility: HOSPITAL | Age: 24
Discharge: HOME | End: 2024-02-10
Attending: EMERGENCY MEDICINE
Payer: COMMERCIAL

## 2024-02-09 DIAGNOSIS — R10.11 RIGHT UPPER QUADRANT ABDOMINAL PAIN: Primary | ICD-10-CM

## 2024-02-09 PROCEDURE — 80053 COMPREHEN METABOLIC PANEL: CPT | Performed by: EMERGENCY MEDICINE

## 2024-02-09 PROCEDURE — 99284 EMERGENCY DEPT VISIT MOD MDM: CPT | Performed by: EMERGENCY MEDICINE

## 2024-02-09 PROCEDURE — 96372 THER/PROPH/DIAG INJ SC/IM: CPT

## 2024-02-09 PROCEDURE — 36415 COLL VENOUS BLD VENIPUNCTURE: CPT | Performed by: EMERGENCY MEDICINE

## 2024-02-09 PROCEDURE — 99283 EMERGENCY DEPT VISIT LOW MDM: CPT | Mod: 25

## 2024-02-09 PROCEDURE — 83690 ASSAY OF LIPASE: CPT | Performed by: EMERGENCY MEDICINE

## 2024-02-09 PROCEDURE — 85025 COMPLETE CBC W/AUTO DIFF WBC: CPT | Performed by: EMERGENCY MEDICINE

## 2024-02-09 ASSESSMENT — PAIN SCALES - GENERAL: PAINLEVEL_OUTOF10: 3

## 2024-02-10 ENCOUNTER — APPOINTMENT (OUTPATIENT)
Dept: RADIOLOGY | Facility: HOSPITAL | Age: 24
End: 2024-02-10
Payer: COMMERCIAL

## 2024-02-10 ENCOUNTER — APPOINTMENT (OUTPATIENT)
Dept: CARDIOLOGY | Facility: HOSPITAL | Age: 24
End: 2024-02-10
Payer: COMMERCIAL

## 2024-02-10 VITALS
OXYGEN SATURATION: 100 % | WEIGHT: 155 LBS | SYSTOLIC BLOOD PRESSURE: 130 MMHG | HEIGHT: 69 IN | DIASTOLIC BLOOD PRESSURE: 79 MMHG | HEART RATE: 79 BPM | RESPIRATION RATE: 16 BRPM | TEMPERATURE: 98.1 F | BODY MASS INDEX: 22.96 KG/M2

## 2024-02-10 LAB
ALBUMIN SERPL-MCNC: 4.8 G/DL (ref 3.5–5)
ALP BLD-CCNC: 70 U/L (ref 35–125)
ALT SERPL-CCNC: 35 U/L (ref 5–40)
ANION GAP SERPL CALC-SCNC: 9 MMOL/L
AST SERPL-CCNC: 19 U/L (ref 5–40)
BASOPHILS # BLD AUTO: 0.09 X10*3/UL (ref 0–0.1)
BASOPHILS NFR BLD AUTO: 0.9 %
BILIRUB SERPL-MCNC: 0.3 MG/DL (ref 0.1–1.2)
BUN SERPL-MCNC: 11 MG/DL (ref 8–25)
CALCIUM SERPL-MCNC: 9.3 MG/DL (ref 8.5–10.4)
CHLORIDE SERPL-SCNC: 101 MMOL/L (ref 97–107)
CO2 SERPL-SCNC: 30 MMOL/L (ref 24–31)
CREAT SERPL-MCNC: 0.8 MG/DL (ref 0.4–1.6)
EGFRCR SERPLBLD CKD-EPI 2021: >90 ML/MIN/1.73M*2
EOSINOPHIL # BLD AUTO: 0.2 X10*3/UL (ref 0–0.7)
EOSINOPHIL NFR BLD AUTO: 2 %
ERYTHROCYTE [DISTWIDTH] IN BLOOD BY AUTOMATED COUNT: 13.1 % (ref 11.5–14.5)
GLUCOSE SERPL-MCNC: 86 MG/DL (ref 65–99)
HCT VFR BLD AUTO: 47.7 % (ref 41–52)
HGB BLD-MCNC: 15.3 G/DL (ref 13.5–17.5)
IMM GRANULOCYTES # BLD AUTO: 0.03 X10*3/UL (ref 0–0.7)
IMM GRANULOCYTES NFR BLD AUTO: 0.3 % (ref 0–0.9)
LIPASE SERPL-CCNC: 28 U/L (ref 16–63)
LYMPHOCYTES # BLD AUTO: 2.77 X10*3/UL (ref 1.2–4.8)
LYMPHOCYTES NFR BLD AUTO: 27.1 %
MCH RBC QN AUTO: 26.4 PG (ref 26–34)
MCHC RBC AUTO-ENTMCNC: 32.1 G/DL (ref 32–36)
MCV RBC AUTO: 82 FL (ref 80–100)
MONOCYTES # BLD AUTO: 0.81 X10*3/UL (ref 0.1–1)
MONOCYTES NFR BLD AUTO: 7.9 %
NEUTROPHILS # BLD AUTO: 6.33 X10*3/UL (ref 1.2–7.7)
NEUTROPHILS NFR BLD AUTO: 61.8 %
NRBC BLD-RTO: 0 /100 WBCS (ref 0–0)
PLATELET # BLD AUTO: 275 X10*3/UL (ref 150–450)
POTASSIUM SERPL-SCNC: 3.8 MMOL/L (ref 3.4–5.1)
PROT SERPL-MCNC: 7.5 G/DL (ref 5.9–7.9)
RBC # BLD AUTO: 5.8 X10*6/UL (ref 4.5–5.9)
RBC MORPH BLD: NORMAL
SODIUM SERPL-SCNC: 140 MMOL/L (ref 133–145)
WBC # BLD AUTO: 10.2 X10*3/UL (ref 4.4–11.3)

## 2024-02-10 PROCEDURE — 93005 ELECTROCARDIOGRAM TRACING: CPT

## 2024-02-10 PROCEDURE — 71045 X-RAY EXAM CHEST 1 VIEW: CPT

## 2024-02-10 PROCEDURE — 71045 X-RAY EXAM CHEST 1 VIEW: CPT | Mod: FOREIGN READ | Performed by: RADIOLOGY

## 2024-02-10 PROCEDURE — 2500000004 HC RX 250 GENERAL PHARMACY W/ HCPCS (ALT 636 FOR OP/ED): Performed by: EMERGENCY MEDICINE

## 2024-02-10 RX ORDER — KETOROLAC TROMETHAMINE 10 MG/1
10 TABLET, FILM COATED ORAL EVERY 6 HOURS PRN
Qty: 20 TABLET | Refills: 0 | Status: SHIPPED | OUTPATIENT
Start: 2024-02-10 | End: 2024-02-15

## 2024-02-10 RX ORDER — KETOROLAC TROMETHAMINE 30 MG/ML
30 INJECTION, SOLUTION INTRAMUSCULAR; INTRAVENOUS ONCE
Status: COMPLETED | OUTPATIENT
Start: 2024-02-10 | End: 2024-02-10

## 2024-02-10 RX ADMIN — KETOROLAC TROMETHAMINE 30 MG: 30 INJECTION, SOLUTION INTRAMUSCULAR; INTRAVENOUS at 01:09

## 2024-02-10 ASSESSMENT — PAIN - FUNCTIONAL ASSESSMENT
PAIN_FUNCTIONAL_ASSESSMENT: 0-10
PAIN_FUNCTIONAL_ASSESSMENT: 0-10

## 2024-02-10 ASSESSMENT — LIFESTYLE VARIABLES
HAVE YOU EVER FELT YOU SHOULD CUT DOWN ON YOUR DRINKING: NO
EVER FELT BAD OR GUILTY ABOUT YOUR DRINKING: NO
HAVE PEOPLE ANNOYED YOU BY CRITICIZING YOUR DRINKING: NO
EVER HAD A DRINK FIRST THING IN THE MORNING TO STEADY YOUR NERVES TO GET RID OF A HANGOVER: NO

## 2024-02-10 ASSESSMENT — PAIN DESCRIPTION - PAIN TYPE: TYPE: ACUTE PAIN

## 2024-02-10 ASSESSMENT — PAIN DESCRIPTION - DESCRIPTORS: DESCRIPTORS: SHARP

## 2024-02-10 ASSESSMENT — PAIN DESCRIPTION - LOCATION
LOCATION: ABDOMEN
LOCATION: ABDOMEN

## 2024-02-10 ASSESSMENT — PAIN SCALES - GENERAL: PAINLEVEL_OUTOF10: 2

## 2024-02-10 NOTE — ED PROVIDER NOTES
HPI   Chief Complaint   Patient presents with    Abdominal Pain     Pt complains of sharp shooting pain below ribs, says pain gets worst when standing. Pain has been ongoing since 7:30pm       Patient presents to the emergency room today with complaints of having right upper quadrant abdominal pain.  Patient states also extending into his chest.  Patient states he is not aware of anything he actually did try to hurt himself.  The patient states that it started earlier in the day, and has been constant throughout the day.  The patient states he feels it mainly takes a deep breath.  Patient states that he had been eating, and did not have any significant pain after eating.  Patient denies any nausea or vomiting.  Patient denies fevers or chills.  Patient states never had any problems with his abdominal area in the past.  Patient denies any history of pancreatitis.  Patient states he is not a heavy drinker.  Patient denies any smoking history.  Patient denies cough or cold symptoms      No data recorded                     Patient History   Past Medical History:   Diagnosis Date    Acute atopic conjunctivitis, left eye 08/31/2017    Allergic conjunctivitis of left eye and rhinitis    Fissure, anal     Impacted cerumen, left ear 10/17/2017    Impacted cerumen of left ear    Otalgia, left ear 08/25/2017    Otalgia, left ear    Other specified health status     No pertinent past medical history    Pain in right shoulder 01/31/2014    Shoulder pain, bilateral    Pain in right wrist 04/10/2018    Right wrist pain    Personal history of other diseases of the nervous system and sense organs 05/07/2013    History of conjunctivitis    Personal history of other diseases of the nervous system and sense organs 08/25/2017    History of impacted cerumen    Personal history of other diseases of the respiratory system 05/07/2013    History of allergic rhinitis    Personal history of other diseases of the respiratory system 08/31/2017     History of sore throat    Unspecified injury of external genitals, initial encounter 04/10/2018    Penis injury, initial encounter     Past Surgical History:   Procedure Laterality Date    COLONOSCOPY  05/2023    ESOPHAGOGASTRODUODENOSCOPY  05/2023    OTHER SURGICAL HISTORY  05/28/2019    Circumcision     Family History   Problem Relation Name Age of Onset    No Known Problems Mother      Hypertension Father      Cancer Paternal Grandfather       Social History     Tobacco Use    Smoking status: Never    Smokeless tobacco: Never   Vaping Use    Vaping Use: Never used   Substance Use Topics    Alcohol use: Yes     Comment: socially    Drug use: Never       Physical Exam   ED Triage Vitals [02/09/24 2338]   Temperature Pulse Respirations BP   36.7 °C (98.1 °F) -- 19 155/74      Pulse Ox Temp Source Heart Rate Source Patient Position   100 % Oral Monitor --      BP Location FiO2 (%)     Left arm --       Physical Exam  Vitals and nursing note reviewed.   Constitutional:       General: He is not in acute distress.     Appearance: He is well-developed.   HENT:      Head: Normocephalic and atraumatic.   Eyes:      Conjunctiva/sclera: Conjunctivae normal.   Cardiovascular:      Rate and Rhythm: Normal rate and regular rhythm.      Heart sounds: No murmur heard.  Pulmonary:      Effort: Pulmonary effort is normal. No respiratory distress.      Breath sounds: Normal breath sounds.   Abdominal:      Palpations: Abdomen is soft.      Tenderness: There is abdominal tenderness in the right upper quadrant. There is no guarding or rebound. Negative signs include Franks's sign and McBurney's sign.   Musculoskeletal:         General: No swelling.      Cervical back: Neck supple.   Skin:     General: Skin is warm and dry.      Capillary Refill: Capillary refill takes less than 2 seconds.   Neurological:      Mental Status: He is alert.   Psychiatric:         Mood and Affect: Mood normal.         ED Course & MDM   ED Course as of  03/08/24 1624   Sat Feb 10, 2024   0023 EKG: Sinus tachycardia, normal QRS, no ST abnormalities.  Interpreted by me. [FR]   0055 Patient's chest x-ray, EKG, and his blood work, show no signs of any acute abnormalities.  The patient's physical examination seem to indicate that he had some right upper quadrant abdominal tenderness.  So I do think that gallbladder is unlikely.  The patient's symptoms do seem musculoskeletal in nature, and I am going to put him on anti-inflammatories for home. [FR]      ED Course User Index  [FR] Erich Ayala DO         Diagnoses as of 03/08/24 1624   Right upper quadrant abdominal pain       Medical Decision Making  After my initial evaluation, the patient will have a chest x-ray and an EKG done.  Patient also will need to have blood work done because of the fact that he is having some abdominal tenderness, although it does not seem to be affected by food.  Patient has minimal risk factors for any other medical problems    Amount and/or Complexity of Data Reviewed  Labs:  Decision-making details documented in ED Course.  Radiology:  Decision-making details documented in ED Course.  ECG/medicine tests:  Decision-making details documented in ED Course.        Procedure  Procedures     Erich Ayala DO  02/10/24 0104       Erich Ayala DO  03/08/24 1624

## 2024-02-12 LAB
ATRIAL RATE: 89 BPM
P AXIS: 75 DEGREES
P OFFSET: 205 MS
P ONSET: 155 MS
PR INTERVAL: 128 MS
Q ONSET: 219 MS
QRS COUNT: 15 BEATS
QRS DURATION: 88 MS
QT INTERVAL: 336 MS
QTC CALCULATION(BAZETT): 408 MS
QTC FREDERICIA: 383 MS
R AXIS: 67 DEGREES
T AXIS: 65 DEGREES
T OFFSET: 387 MS
VENTRICULAR RATE: 89 BPM

## 2024-02-22 ENCOUNTER — OFFICE VISIT (OUTPATIENT)
Dept: SURGERY | Facility: CLINIC | Age: 24
End: 2024-02-22
Payer: COMMERCIAL

## 2024-02-22 VITALS
DIASTOLIC BLOOD PRESSURE: 72 MMHG | SYSTOLIC BLOOD PRESSURE: 110 MMHG | HEART RATE: 96 BPM | OXYGEN SATURATION: 98 % | HEIGHT: 69 IN | BODY MASS INDEX: 23.28 KG/M2 | WEIGHT: 157.2 LBS

## 2024-02-22 DIAGNOSIS — K60.2 ANAL FISSURE: Primary | ICD-10-CM

## 2024-02-22 PROCEDURE — 99212 OFFICE O/P EST SF 10 MIN: CPT | Performed by: SURGERY

## 2024-02-22 PROCEDURE — 1036F TOBACCO NON-USER: CPT | Performed by: SURGERY

## 2024-02-22 ASSESSMENT — ENCOUNTER SYMPTOMS
BRUISES/BLEEDS EASILY: 0
BLOOD IN STOOL: 0
CHILLS: 0
VOMITING: 0
FEVER: 0
WOUND: 0
FACIAL SWELLING: 0
LIGHT-HEADEDNESS: 0
DIARRHEA: 0
COLOR CHANGE: 0
ABDOMINAL PAIN: 0
EYE REDNESS: 0
CHEST TIGHTNESS: 0
ABDOMINAL DISTENTION: 0
CONSTIPATION: 0
NERVOUS/ANXIOUS: 0
TROUBLE SWALLOWING: 0
NECK PAIN: 0
CONFUSION: 0
SHORTNESS OF BREATH: 0
DIFFICULTY URINATING: 0
ADENOPATHY: 0
SORE THROAT: 0
WEAKNESS: 0
BACK PAIN: 0
SPEECH DIFFICULTY: 0
NAUSEA: 0

## 2024-02-22 ASSESSMENT — PAIN SCALES - GENERAL: PAINLEVEL: 0-NO PAIN

## 2024-02-22 NOTE — PROGRESS NOTES
General Surgery Service    History Of Present Illness    James Johnson is a 24 y.o. male presenting with 6-week follow-up after starting nitroglycerin for anal fissure.  No side effects, bleeding and pain improved after 3 weeks of treatment.  He is doing fiber supplementation.  No concerns..     Past Medical History  He has a past medical history of Acute atopic conjunctivitis, left eye (2017), Fissure, anal, Impacted cerumen, left ear (10/17/2017), Otalgia, left ear (2017), Other specified health status, Pain in right shoulder (2014), Pain in right wrist (04/10/2018), Personal history of other diseases of the nervous system and sense organs (2013), Personal history of other diseases of the nervous system and sense organs (2017), Personal history of other diseases of the respiratory system (2013), Personal history of other diseases of the respiratory system (2017), and Unspecified injury of external genitals, initial encounter (04/10/2018).    Current Outpatient Medications on File Prior to Visit   Medication Sig Dispense Refill    nitroglycerin (Abraham-Bid) 2 % ointment Apply a pea-sized amount to the top of a Q-tip.  Apply inside your anus twice per day. 60 g 3    dicyclomine (Bentyl) 20 mg tablet Take 1 tablet (20 mg) by mouth every 8 hours.      [] ketorolac (Toradol) 10 mg tablet Take 1 tablet (10 mg) by mouth every 6 hours if needed for moderate pain (4 - 6) for up to 5 days. 20 tablet 0     No current facility-administered medications on file prior to visit.         Surgical History  He has a past surgical history that includes Other surgical history (2019); Esophagogastroduodenoscopy (2023); and Colonoscopy (2023).     Social History  He reports that he has never smoked. He has never used smokeless tobacco. He reports current alcohol use. He reports that he does not use drugs.    Family History  Family History   Problem Relation Name Age of  Onset    No Known Problems Mother      Hypertension Father      Cancer Paternal Grandfather          Allergies  Watermelon    Review of Systems   Constitutional:  Negative for chills and fever.   HENT:  Negative for facial swelling, sore throat and trouble swallowing.    Eyes:  Negative for redness and visual disturbance.   Respiratory:  Negative for chest tightness and shortness of breath.    Cardiovascular:  Negative for chest pain and leg swelling.   Gastrointestinal:  Negative for abdominal distention, abdominal pain, blood in stool, constipation, diarrhea, nausea and vomiting.   Endocrine: Negative for cold intolerance and heat intolerance.   Genitourinary:  Negative for difficulty urinating and enuresis.   Musculoskeletal:  Negative for back pain, gait problem and neck pain.   Skin:  Negative for color change, rash and wound.   Allergic/Immunologic: Negative for immunocompromised state.   Neurological:  Negative for speech difficulty, weakness and light-headedness.   Hematological:  Negative for adenopathy. Does not bruise/bleed easily.   Psychiatric/Behavioral:  Negative for confusion. The patient is not nervous/anxious.         Physical Exam  Constitutional:       General: He is not in acute distress.     Appearance: He is not toxic-appearing.   HENT:      Head: Normocephalic and atraumatic.      Mouth/Throat:      Mouth: Mucous membranes are moist.      Pharynx: Oropharynx is clear.   Eyes:      General: No scleral icterus.     Extraocular Movements: Extraocular movements intact.      Pupils: Pupils are equal, round, and reactive to light.   Cardiovascular:      Rate and Rhythm: Normal rate and regular rhythm.   Pulmonary:      Effort: Pulmonary effort is normal.      Breath sounds: Normal breath sounds.   Abdominal:      General: There is no distension.      Palpations: Abdomen is soft. There is no mass.      Tenderness: There is no abdominal tenderness. There is no guarding.      Hernia: No hernia is  "present.   Genitourinary:     Rectum: Normal.   Musculoskeletal:         General: No swelling. Normal range of motion.      Cervical back: Normal range of motion.   Skin:     General: Skin is warm and dry.      Coloration: Skin is not jaundiced.   Neurological:      General: No focal deficit present.      Mental Status: He is alert and oriented to person, place, and time.   Psychiatric:         Mood and Affect: Mood normal.         Behavior: Behavior normal.          Last Recorded Vitals  /72 (BP Location: Right arm, Patient Position: Sitting)   Pulse 96   Wt 71.3 kg (157 lb 3.2 oz)   SpO2 98%     Relevant Results      No results found for this or any previous visit (from the past 24 hour(s)).   No results found for: \"HGBA1C\"   ECG 12 lead    Result Date: 2/12/2024  Normal sinus rhythm Nonspecific T wave abnormality Poor data quality Abnormal ECG Confirmed by Anand Brooks ([id number]) on 2/12/2024 9:12:02 PM    XR chest 1 view    Result Date: 2/10/2024  STUDY: Chest Radiograph;  2/10/2024 12:09 AM INDICATION: Chest pain. COMPARISON: 12/13/2023 XR Chest ACCESSION NUMBER(S): [id number] ORDERING CLINICIAN: SALOMÓN TORRES TECHNIQUE:  Frontal chest was obtained at 00:09 hours. FINDINGS: CARDIOMEDIASTINAL SILHOUETTE: Cardiomediastinal silhouette is normal in size and configuration.  LUNGS: Lungs are clear.  ABDOMEN: No remarkable upper abdominal findings.  BONES: No acute osseous changes.    No acute pulmonary abnormality. Signed by Jw Dominguez MD    MR thoracic spine wo IV contrast    Result Date: 2/7/2024  Interpreted By:  Renu Castañdea, STUDY: MR THORACIC SPINE WO IV CONTRAST;  2/7/2024 11:09 am   INDICATION: Signs/Symptoms:chronic upper back pain.   COMPARISON: None.   ACCESSION NUMBER(S): [id number]   ORDERING CLINICIAN: TAMMY BA   TECHNIQUE: Sagittal T1, T2, STIR and axial T2 and T1 weighted MR images of the thoracic spine were obtained.   FINDINGS: Counting was performed from the C2 vertebral " body on the  image.   Alignment, vertebral body heights and marrow signal pattern are within normal limits. There is desiccated disc signal with mild disc height loss at T4-T5 and T5-T6. Intervertebral disc spaces are otherwise maintained.   The thoracic cord is unremarkable.   No significant spinal canal or neural foraminal stenosis.       No significant spinal canal or neural foraminal stenosis.   MACRO: None   Signed by: Renu Castañeda 2/7/2024 12:22 PM Dictation workstation:   [id number]    XR shoulder right 2+ views    Result Date: 2/2/2024  Interpreted By:  Oscar Lawrence and Ohs Zachary STUDY: XR SHOULDER RIGHT 2+ VIEWS;  2/1/2024 3:11 pm   INDICATION: Signs/Symptoms:right shoulder pain.   COMPARISON: Chest radiograph 12/13/2023.   ACCESSION NUMBER(S): [id number]   ORDERING CLINICIAN: TAMMY BA   FINDINGS: There is no acute fracture or dislocation. There is a normal glenohumeral articulation. There is no acromioclavicular degenerative change. The visualized ribs are intact. The visualized lung is clear.       Unremarkable right shoulder radiographs.   I personally reviewed the images/study and I agree with the findings as stated by Dr. Estiven Odonnell. This study was interpreted at [place], [place].   MACRO: None   Signed by: Oscar Lawrence 2/2/2024 10:13 AM Dictation workstation:   [id number]    XR lumbar spine complete 4+ views    Result Date: 2/2/2024  Interpreted By:  Oscar Lawrence and Ohs Zachary STUDY: XR LUMBAR SPINE COMPLETE 4+ VIEWS;  2/1/2024 3:10 pm   INDICATION: Signs/Symptoms:left sided back pain.   COMPARISON: CT abdomen/pelvis 05/09/2023.   ACCESSION NUMBER(S): [id number]   ORDERING CLINICIAN: TAMMY BA   FINDINGS: There is a normal lordotic curvature to the lumbar spine. Vertebral body heights are maintained. Mild facet arthrosis of L5-S1. There is no fracture or spondylolysis. The SI joints are not narrowed or widened. The visualized  pelvic ring is intact.       1. No fracture or spondylolysis. 2. Mild facet arthrosis of L5-S1.   I personally reviewed the images/study and I agree with the findings as stated by Dr. Estiven Odonnell. This study was interpreted at [facility], [place], [place].   MACRO: None   Signed by: Oscar Lawrence 2/2/2024 10:11 AM Dictation workstation:   [id number]      Active Problems:  There are no active Hospital Problems.     Assessment/Plan   Problem List Items Addressed This Visit             ICD-10-CM    Anal fissure - Primary K60.2     Pain and bleeding improved after 3 weeks of fissure cream.  He has been doing fiber supplementation.  Recommend to continue.                   Josue Jimenez MD

## 2024-02-22 NOTE — ASSESSMENT & PLAN NOTE
Pain and bleeding improved after 3 weeks of fissure cream.  He has been doing fiber supplementation.  Recommend to continue.

## 2024-04-15 ENCOUNTER — APPOINTMENT (OUTPATIENT)
Dept: PAIN MEDICINE | Facility: CLINIC | Age: 24
End: 2024-04-15
Payer: COMMERCIAL

## 2024-05-10 ENCOUNTER — APPOINTMENT (OUTPATIENT)
Dept: RADIOLOGY | Facility: HOSPITAL | Age: 24
End: 2024-05-10
Payer: COMMERCIAL

## 2024-05-10 ENCOUNTER — HOSPITAL ENCOUNTER (EMERGENCY)
Facility: HOSPITAL | Age: 24
Discharge: HOME | End: 2024-05-10
Payer: COMMERCIAL

## 2024-05-10 VITALS
WEIGHT: 150 LBS | BODY MASS INDEX: 22.22 KG/M2 | OXYGEN SATURATION: 98 % | DIASTOLIC BLOOD PRESSURE: 80 MMHG | RESPIRATION RATE: 15 BRPM | HEIGHT: 69 IN | SYSTOLIC BLOOD PRESSURE: 146 MMHG | TEMPERATURE: 98.1 F | HEART RATE: 70 BPM

## 2024-05-10 DIAGNOSIS — S09.90XA CLOSED HEAD INJURY, INITIAL ENCOUNTER: ICD-10-CM
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
## 2024-05-10 PROCEDURE — 70450 CT HEAD/BRAIN W/O DYE: CPT

## 2024-05-10 PROCEDURE — 70450 CT HEAD/BRAIN W/O DYE: CPT | Performed by: RADIOLOGY

## 2024-05-10 PROCEDURE — 99284 EMERGENCY DEPT VISIT MOD MDM: CPT | Mod: 25

## 2024-05-10 RX ORDER — IBUPROFEN 600 MG/1
600 TABLET ORAL EVERY 6 HOURS PRN
Qty: 20 TABLET | Refills: 0 | Status: SHIPPED | OUTPATIENT
Start: 2024-05-10 | End: 2024-05-17

## 2024-05-10 ASSESSMENT — PAIN SCALES - GENERAL: PAINLEVEL_OUTOF10: 5 - MODERATE PAIN

## 2024-05-10 ASSESSMENT — PAIN - FUNCTIONAL ASSESSMENT: PAIN_FUNCTIONAL_ASSESSMENT: 0-10

## 2024-05-10 ASSESSMENT — PAIN DESCRIPTION - FREQUENCY: FREQUENCY: CONSTANT/CONTINUOUS

## 2024-05-10 NOTE — Clinical Note
James Johnson was seen and treated in our emergency department on 5/10/2024.  He may return to work on 05/12/2024.  Patient may return to activities earlier if feeling improved.     If you have any questions or concerns, please don't hesitate to call.      Jeyson Garcia PA-C

## 2024-05-11 NOTE — DISCHARGE INSTRUCTIONS
Thank you for allowing me to take care of you at [facility] Emergency Department.  I hope you are feeling better.  Please return to the ED if you have any new or worsening symptoms.  Otherwise follow-up with your primary doctor.  Use rest and ice intermittently for 20 minutes at a time, multiple times a day for pain.  Jeyson Garcia PA-C

## 2024-05-11 NOTE — ED PROVIDER NOTES
HPI   Chief Complaint   Patient presents with    Head Injury     PT presents to the ED with c/c of headache and feeling off after a fall with head injury. Pt states he was drinking on Monday and fell hitting his head. Pt states he did vomit but has had no n/v or vision changes. Pupils are PEERL at this time. Pt states he has had a headache since the injury that tylenol will not help.        24-year-old male seen in the ED for evaluation of a closed head injury that occurred a couple days ago.  Reportedly he was drinking, he stood up too fast he feels like and he stumbled fell over and hit the right side of his face on the ground and he has left-sided headache since the incident.  He states he vomited that evening but thought it could be related to alcohol use rather than the head injury but has had persistent headache symptoms on the left side of his head which prompted the visit to the ED today.  Has been trying Tylenol at home without improvement.  Denies numbness or tingling or weakness of extremities, anticoagulant use, loss of consciousness.                          No data recorded                   Patient History   Past Medical History:   Diagnosis Date    Acute atopic conjunctivitis, left eye 08/31/2017    Allergic conjunctivitis of left eye and rhinitis    Fissure, anal     Impacted cerumen, left ear 10/17/2017    Impacted cerumen of left ear    Otalgia, left ear 08/25/2017    Otalgia, left ear    Other specified health status     No pertinent past medical history    Pain in right shoulder 01/31/2014    Shoulder pain, bilateral    Pain in right wrist 04/10/2018    Right wrist pain    Personal history of other diseases of the nervous system and sense organs 05/07/2013    History of conjunctivitis    Personal history of other diseases of the nervous system and sense organs 08/25/2017    History of impacted cerumen    Personal history of other diseases of the respiratory system 05/07/2013    History of  allergic rhinitis    Personal history of other diseases of the respiratory system 08/31/2017    History of sore throat    Unspecified injury of external genitals, initial encounter 04/10/2018    Penis injury, initial encounter     Past Surgical History:   Procedure Laterality Date    COLONOSCOPY  05/2023    ESOPHAGOGASTRODUODENOSCOPY  05/2023    OTHER SURGICAL HISTORY  05/28/2019    Circumcision     Family History   Problem Relation Name Age of Onset    No Known Problems Mother      Hypertension Father      Cancer Paternal Grandfather       Social History     Tobacco Use    Smoking status: Never    Smokeless tobacco: Never   Vaping Use    Vaping status: Never Used   Substance Use Topics    Alcohol use: Yes     Comment: socially    Drug use: Never       Physical Exam   ED Triage Vitals [05/10/24 1944]   Temperature Heart Rate Respirations BP   36.7 °C (98.1 °F) 70 15 146/80      Pulse Ox Temp Source Heart Rate Source Patient Position   98 % Oral Monitor Sitting      BP Location FiO2 (%)     Left arm --       Physical Exam  Constitutional:       Comments: GENERAL APPEARANCE:  Well nourished.  Well developed.  No acute distress.  HEENT: Normocephalic. atraumatic.   NECK: Trachea midline.   CARDIOVASCULAR: Heart is regular rate and rhythm and without murmur.  RESPIRATORY:  Lungs are clear to auscultation bilaterally.  No increased respiratory effort.  No acute respiratory distress.  GASTROINESTIONAL: Soft, non-distended with normal bowel sounds.  No tenderness to palpation.  No rebound, guarding or peritonitis.  No palpable or pulsatile masses.  MUSCULOSKELETAL: Moves extremities. No injury or obvious deformity.  NEUROLOGIC:  Alert and oriented.  No focal neurologic deficits.  No dysarthria, aphasia, disconjugate gaze, gaze preference, facial asymmetry, strength or sensory discrepancies of the upper or lower extremity, altered mental status, ataxic movements.  NIH assessment 0.  BEHAVIORAL:  Age appropriate and  cooperative.  SKIN:  Clean and dry.         ED Course & MDM   Diagnoses as of 05/10/24 2014   Fall, initial encounter   Closed head injury, initial encounter       Medical Decision Making  24-year-old male seen in the ED for evaluation of closed head injury that occurred a couple days ago after falling when drinking with friends.  Due to consideration for emergent process including intracranial hemorrhage, fracture amongst others a work-up is pursued.  CT brain negative for emergent process.  Will be treated symptomatically for closed head injury.  Instructed on supportive care, close outpatient follow-up and return precautions.  My suspicion is for closed head injury and mild concussive symptoms causing his presenting complaint.    CT head wo IV contrast   Final Result    No evidence of acute cortical infarct or intracranial hemorrhage.          MACRO:    None                Signed by: Jaiden Mancia 5/10/2024 8:52 PM    Dictation workstation:   [id number]       This document was completed using voice recognition transcription software.  Please excuse any errors of transcription including grammatical, punctuation and spelling errors.  Please contact me with any questions regarding this chart.    Amount and/or Complexity of Data Reviewed  Radiology: ordered. Decision-making details documented in ED Course.    Risk  OTC drugs.        Procedure  Procedures     Jeyson Garcia PA-C  05/10/24 [id number]

## 2024-05-14 ENCOUNTER — OFFICE VISIT (OUTPATIENT)
Dept: DERMATOLOGY | Facility: CLINIC | Age: 24
End: 2024-05-14
Payer: COMMERCIAL

## 2024-05-14 DIAGNOSIS — L23.89 OTHER ALLERGIC CONTACT DERMATITIS: Primary | ICD-10-CM

## 2024-05-14 PROCEDURE — 99213 OFFICE O/P EST LOW 20 MIN: CPT | Performed by: NURSE PRACTITIONER

## 2024-05-14 PROCEDURE — 1036F TOBACCO NON-USER: CPT | Performed by: NURSE PRACTITIONER

## 2024-05-14 RX ORDER — TRIAMCINOLONE ACETONIDE 1 MG/G
OINTMENT TOPICAL
Qty: 80 G | Refills: 1 | Status: SHIPPED | OUTPATIENT
Start: 2024-05-14

## 2024-05-14 NOTE — PROGRESS NOTES
Subjective     James Johnson is a 24 y.o. male who presents for the following: Atopic dermatitis and related condition (Arms ).     Review of Systems:  No other skin or systemic complaints other than what is documented elsewhere in the note.    The following portions of the chart were reviewed this encounter and updated as appropriate:   Tobacco  Allergies  Meds  Problems  Med Hx  Surg Hx         Skin Cancer History  No skin cancer on file.      Specialty Problems          Dermatology Problems    Atopic dermatitis    Tinea cruris    Seborrheic dermatitis        Objective   Well appearing patient in no apparent distress; mood and affect are within normal limits.    A focused skin examination was performed waist up. All findings within normal limits unless otherwise noted below.    Assessment/Plan   1. Other allergic contact dermatitis  Left Forearm - Anterior, Right Forearm - Anterior  Erythematous scaly patches in a contact pattern involving forearms and stopping at AC fossa area    This is a 24 y.o. male  following up for new rash to the forearms starting 2 weeks after starting new job (started job 3/28/24). Patient has been applying eucrisa BID but not clearing the rash up. He reports wearing long sleeves when working. Does not report getting better on the weekends. Itch currently 4/10, but worse without eucrisa. 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 He wears non latex gloves and of note hs palms and dorsal hands are spared. Referral to allergy for patch testing and I will send in TAC to be applied BID x2 weeks then daily x1 week then every other day x1 week then use as needed. Advised patient to contact his work place to identify all potential chemicals he is exposed to with his job prior to appointment with Dr. Lee. Also advised to inquire with his work place regarding using gloves that extend covering up his forearms as well. May continue to use long sleeve shirts.  Patient will keep me posted and follow up for normal atopic dermatitis in January 2025 or sooner if needed.           Related Procedures  Referral to Allergy

## 2024-05-24 ENCOUNTER — OFFICE VISIT (OUTPATIENT)
Dept: PRIMARY CARE | Facility: CLINIC | Age: 24
End: 2024-05-24
Payer: COMMERCIAL

## 2024-05-24 VITALS
HEART RATE: 87 BPM | OXYGEN SATURATION: 98 % | SYSTOLIC BLOOD PRESSURE: 118 MMHG | HEIGHT: 69 IN | BODY MASS INDEX: 22.66 KG/M2 | DIASTOLIC BLOOD PRESSURE: 68 MMHG | TEMPERATURE: 98 F | WEIGHT: 153 LBS

## 2024-05-24 DIAGNOSIS — J02.9 PHARYNGITIS, UNSPECIFIED ETIOLOGY: Primary | ICD-10-CM

## 2024-05-24 LAB — POC RAPID STREP: NEGATIVE

## 2024-05-24 PROCEDURE — 87880 STREP A ASSAY W/OPTIC: CPT | Performed by: INTERNAL MEDICINE

## 2024-05-24 PROCEDURE — 99213 OFFICE O/P EST LOW 20 MIN: CPT | Performed by: INTERNAL MEDICINE

## 2024-05-24 PROCEDURE — 1036F TOBACCO NON-USER: CPT | Performed by: INTERNAL MEDICINE

## 2024-05-24 PROCEDURE — 87081 CULTURE SCREEN ONLY: CPT | Mod: WESLAB | Performed by: INTERNAL MEDICINE

## 2024-05-24 ASSESSMENT — ENCOUNTER SYMPTOMS
SORE THROAT: 1
CONSTIPATION: 0
DEPRESSION: 0
OCCASIONAL FEELINGS OF UNSTEADINESS: 0
COUGH: 1
UNEXPECTED WEIGHT CHANGE: 0
LOSS OF SENSATION IN FEET: 0
SHORTNESS OF BREATH: 0
ABDOMINAL PAIN: 0
BACK PAIN: 1
HEMATURIA: 0
SINUS PAIN: 0
FATIGUE: 0
WHEEZING: 0
JOINT SWELLING: 0
BLOOD IN STOOL: 0
PALPITATIONS: 0

## 2024-05-24 ASSESSMENT — COLUMBIA-SUICIDE SEVERITY RATING SCALE - C-SSRS
1. IN THE PAST MONTH, HAVE YOU WISHED YOU WERE DEAD OR WISHED YOU COULD GO TO SLEEP AND NOT WAKE UP?: NO
2. HAVE YOU ACTUALLY HAD ANY THOUGHTS OF KILLING YOURSELF?: NO
6. HAVE YOU EVER DONE ANYTHING, STARTED TO DO ANYTHING, OR PREPARED TO DO ANYTHING TO END YOUR LIFE?: NO

## 2024-05-24 ASSESSMENT — PAIN SCALES - GENERAL: PAINLEVEL: 4

## 2024-05-24 NOTE — PROGRESS NOTES
"Subjective   Patient ID: James Johnson is a 24 y.o. male who presents for Swollen tonsils (Sore throat 1 week).    HPI     Complaining of left sided sore throat and swelling since last one week.   Has mild cough from past several weeks.  Denied any fever, chills, ear pain, abdominal pain    Review of Systems   Constitutional:  Negative for fatigue and unexpected weight change.   HENT:  Positive for sore throat. Negative for congestion, ear pain and sinus pain.    Respiratory:  Positive for cough. Negative for shortness of breath and wheezing.    Cardiovascular:  Negative for chest pain, palpitations and leg swelling.   Gastrointestinal:  Negative for abdominal pain, blood in stool and constipation.   Genitourinary:  Negative for hematuria and urgency.   Musculoskeletal:  Positive for back pain. Negative for joint swelling.   Skin:  Negative for rash.       Objective   /68 (BP Location: Left arm, Patient Position: Sitting, BP Cuff Size: Small adult)   Pulse 87   Temp 36.7 °C (98 °F) (Temporal)   Ht 1.753 m (5' 9\")   Wt 69.4 kg (153 lb)   SpO2 98%   BMI 22.59 kg/m²     Physical Exam  Constitutional:       Appearance: Normal appearance.   HENT:      Head: Normocephalic and atraumatic.      Right Ear: Tympanic membrane normal.      Left Ear: Tympanic membrane normal.      Mouth/Throat:      Pharynx: No oropharyngeal exudate or posterior oropharyngeal erythema.      Comments: Left tonsil enlarged, no exudates noticed  Eyes:      Extraocular Movements: Extraocular movements intact.      Conjunctiva/sclera: Conjunctivae normal.      Pupils: Pupils are equal, round, and reactive to light.   Cardiovascular:      Rate and Rhythm: Normal rate and regular rhythm.      Heart sounds: No murmur heard.  Pulmonary:      Effort: Pulmonary effort is normal. No respiratory distress.      Breath sounds: Normal breath sounds.   Abdominal:      General: Abdomen is flat. Bowel sounds are normal.      Palpations: Abdomen " is soft.   Musculoskeletal:      Right lower leg: No edema.      Left lower leg: No edema.   Neurological:      Mental Status: He is alert.         Assessment/Plan         James was seen today for swollen tonsils.  Diagnoses and all orders for this visit:  Pharyngitis, unspecified etiology (Primary)  -     POCT Rapid Strep A manually resulted  -     Cancel: Group A Streptococcus, Culture; Future  -     Group A Streptococcus, Culture     Advised warm water with salt gargles as needed  Take Tylenol or ibuprofen if pain gets worse  Wait for strep culture    Current Outpatient Medications   Medication Instructions    nitroglycerin (Abraham-Bid) 2 % ointment Apply a pea-sized amount to the top of a Q-tip.  Apply inside your anus twice per day.    triamcinolone (Kenalog) 0.1 % ointment Apply to affected areas twice daily for 2 weeks then daily for 1 week then every other day for 1 week then stop. Then may used as needed when active. When using for maintenance, use less than 14 days per month.

## 2024-05-26 LAB — S PYO THROAT QL CULT: NORMAL

## 2024-05-28 ENCOUNTER — TELEPHONE (OUTPATIENT)
Dept: PRIMARY CARE | Facility: CLINIC | Age: 24
End: 2024-05-28
Payer: COMMERCIAL

## 2024-05-28 NOTE — TELEPHONE ENCOUNTER
Patient was told to follow up if the throat culture that was sent out came back negative.  He is wondering what the next step should be.

## 2024-06-05 ENCOUNTER — TELEPHONE (OUTPATIENT)
Dept: PRIMARY CARE | Facility: CLINIC | Age: 24
End: 2024-06-05
Payer: COMMERCIAL

## 2024-06-05 DIAGNOSIS — J35.1 SWELLING OF TONSIL: ICD-10-CM

## 2024-07-18 ENCOUNTER — APPOINTMENT (OUTPATIENT)
Dept: OTOLARYNGOLOGY | Facility: CLINIC | Age: 24
End: 2024-07-18
Payer: COMMERCIAL

## 2024-07-25 ENCOUNTER — APPOINTMENT (OUTPATIENT)
Dept: OTOLARYNGOLOGY | Facility: CLINIC | Age: 24
End: 2024-07-25
Payer: COMMERCIAL

## 2024-07-25 VITALS
BODY MASS INDEX: 22.59 KG/M2 | HEIGHT: 69 IN | TEMPERATURE: 97.8 F | DIASTOLIC BLOOD PRESSURE: 73 MMHG | SYSTOLIC BLOOD PRESSURE: 133 MMHG

## 2024-07-25 DIAGNOSIS — J35.8 ASYMMETRIC TONSILS: Primary | ICD-10-CM

## 2024-07-25 PROCEDURE — 1036F TOBACCO NON-USER: CPT | Performed by: OTOLARYNGOLOGY

## 2024-07-25 PROCEDURE — 99203 OFFICE O/P NEW LOW 30 MIN: CPT | Performed by: OTOLARYNGOLOGY

## 2024-07-25 NOTE — PROGRESS NOTES
Impression:  1. Asymmetric tonsils             RECOMMENDATIONS/PLAN :  I reassured the patient that his previous swelling of the left tonsil has come down in both tonsils appear fairly symmetric today.  He will keep track of any future infections however I do not recommend any surgical intervention at the present time.  I reassured the patient that everything appears fairly normal      **This electronic medical record note was created with the use of voice recognition software.  Despite proofreading, typographical or grammatical errors may be present that could affect meaning of content **    Subjective   Patient ID:     James Johnson is a 24 y.o. male who presents to the office today after he previously had an enlarged tonsil on that left side that was much different from the right.  He has not had frequent tonsil infections or any obstructive issues or sleep apnea.  Currently he denies any sore throat fever or chills.  No postnasal drip.    ROS:  A detailed 12 system review of systems is noted on the intake form has been reviewed with the patient with details noted in the HPI and scanned into the patient's medical record.    Objective     Past Medical History:   Diagnosis Date    Acute atopic conjunctivitis, left eye 08/31/2017    Allergic conjunctivitis of left eye and rhinitis    Fissure, anal     Impacted cerumen, left ear 10/17/2017    Impacted cerumen of left ear    Otalgia, left ear 08/25/2017    Otalgia, left ear    Other specified health status     No pertinent past medical history    Pain in right shoulder 01/31/2014    Shoulder pain, bilateral    Pain in right wrist 04/10/2018    Right wrist pain    Personal history of other diseases of the nervous system and sense organs 05/07/2013    History of conjunctivitis    Personal history of other diseases of the nervous system and sense organs 08/25/2017    History of impacted cerumen    Personal history of other diseases of the respiratory system  "05/07/2013    History of allergic rhinitis    Personal history of other diseases of the respiratory system 08/31/2017    History of sore throat    Unspecified injury of external genitals, initial encounter 04/10/2018    Penis injury, initial encounter        Past Surgical History:   Procedure Laterality Date    COLONOSCOPY  05/2023    ESOPHAGOGASTRODUODENOSCOPY  05/2023    OTHER SURGICAL HISTORY  05/28/2019    Circumcision        Allergies   Allergen Reactions    Watermelon Itching          Current Outpatient Medications:     triamcinolone (Kenalog) 0.1 % ointment, Apply to affected areas twice daily for 2 weeks then daily for 1 week then every other day for 1 week then stop. Then may used as needed when active. When using for maintenance, use less than 14 days per month., Disp: 80 g, Rfl: 1     Tobacco Use: Low Risk  (7/25/2024)    Patient History     Smoking Tobacco Use: Never     Smokeless Tobacco Use: Never     Passive Exposure: Not on file        Alcohol Use: Not At Risk (1/24/2024)    AUDIT-C     Frequency of Alcohol Consumption: 2-4 times a month     Average Number of Drinks: 3 or 4     Frequency of Binge Drinking: Never        Social History     Substance and Sexual Activity   Drug Use Never        Physical Exam:  Visit Vitals  /73   Temp 36.6 °C (97.8 °F) (Temporal)   Ht 1.753 m (5' 9\")   BMI 22.59 kg/m²   Smoking Status Never   BSA 1.84 m²      General: Patient is alert, oriented, cooperative in no apparent distress.  Head: Normocephalic, atraumatic.  Eyes: PERRL, EOMI, Conjunctiva is clear. No nystagmus.  Ears: Right Ear-- Pinna is normal.  External auditory canal is patent. Tympanic membrane is [intact, translucent and has good mobility with my pneumatic otoscope. No effusion].  Mastoid is nontender.  Left ear-- Pinna is normal.  External auditory canal is patent. Tympanic membrane is [intact, translucent and has good mobility with my pneumatic otoscope.  No effusion].  Mastoid is nontender.  Nose: " Septum is straight.  No septal perforation or lesions. No septal hematoma/ seroma.  No signs of bleeding.  Inferior turbinates are normal.   No evidence of intranasal polyps.  No infectious drainage.  Throat:  Floor of mouth is clear, no masses.  Tongue appears normal, no lesions or masses. Gums, gingiva, buccal mucosa appear pink and moist, no lesions. Teeth are in good repair.  No obvious dental infections.  Peritonsillar regions appear symmetric without swelling.  Hard and soft palate appear normal, no obvious cleft. Uvula is midline.  Left Tonsil --+2, no exudates.  Right Tonsil --+2, no exudates.  Oropharynx: No lesions. Retropharyngeal wall is flat.  No active postnasal drip.  Neck: Supple,  no lymphadenopathy.  No masses.  Salivary Glands: Symmetric bilaterally.  No palpable masses.  No evidence of acute infection or salivary stones  Neurologic: Cranial Nerves 2-12 are grossly intact without focal deficits. Cerebellar function testing is normal.     Results:   []    Procedure:   []    Harmeet Sigala, DO

## 2024-09-09 ENCOUNTER — APPOINTMENT (OUTPATIENT)
Dept: DERMATOLOGY | Facility: CLINIC | Age: 24
End: 2024-09-09
Payer: COMMERCIAL

## 2024-09-11 ENCOUNTER — APPOINTMENT (OUTPATIENT)
Dept: ALLERGY | Facility: CLINIC | Age: 24
End: 2024-09-11
Payer: COMMERCIAL

## 2025-02-20 ENCOUNTER — APPOINTMENT (OUTPATIENT)
Dept: DERMATOLOGY | Facility: CLINIC | Age: 25
End: 2025-02-20
Payer: COMMERCIAL

## 2025-04-04 ENCOUNTER — TELEPHONE (OUTPATIENT)
Dept: DERMATOLOGY | Facility: CLINIC | Age: 25
End: 2025-04-04
Payer: COMMERCIAL

## 2025-04-04 DIAGNOSIS — L20.9 ATOPIC DERMATITIS AND RELATED CONDITION: Primary | ICD-10-CM

## 2025-04-29 ENCOUNTER — APPOINTMENT (OUTPATIENT)
Dept: DERMATOLOGY | Facility: CLINIC | Age: 25
End: 2025-04-29
Payer: COMMERCIAL

## 2025-04-29 DIAGNOSIS — D48.5 NEOPLASM OF UNCERTAIN BEHAVIOR OF SKIN: Primary | ICD-10-CM

## 2025-04-29 DIAGNOSIS — L20.9 ATOPIC DERMATITIS AND RELATED CONDITION: ICD-10-CM

## 2025-04-29 PROCEDURE — 99213 OFFICE O/P EST LOW 20 MIN: CPT | Performed by: NURSE PRACTITIONER

## 2025-04-29 PROCEDURE — 11102 TANGNTL BX SKIN SINGLE LES: CPT | Performed by: NURSE PRACTITIONER

## 2025-04-29 PROCEDURE — 1036F TOBACCO NON-USER: CPT | Performed by: NURSE PRACTITIONER

## 2025-04-29 NOTE — LETTER
April 29, 2025     Patient: James Johnson   YOB: 2000   Date of Visit: 4/29/2025       To Whom It May Concern:    James Johnson was seen in my clinic on 4/29/2025 at 2:15 pm. Please excuse James for his absence from work on this day to make the appointment.    If you have any questions or concerns, please don't hesitate to call.         Sincerely,         Mason Wick, APRN-CNP        CC: No Recipients

## 2025-04-29 NOTE — PROGRESS NOTES
Subjective     James Johnson is a 25 y.o. male who presents for the following: Suspicious Skin Lesion (Left forearm, itchy.).     Mole is constantly itchy.     Review of Systems:  No other skin or systemic complaints other than what is documented elsewhere in the note.    The following portions of the chart were reviewed this encounter and updated as appropriate:   Tobacco  Allergies  Meds  Problems  Med Hx  Surg Hx         Skin Cancer History  Biopsy Log Book  No skin cancers from Specimen Tracking.    Additional History      Specialty Problems          Dermatology Problems    Atopic dermatitis    Tinea cruris    Seborrheic dermatitis        Objective   Well appearing patient in no apparent distress; mood and affect are within normal limits.    A focused skin examination was performed waist up. All findings within normal limits unless otherwise noted below.    Assessment/Plan   Skin Exam  1. NEOPLASM OF UNCERTAIN BEHAVIOR OF SKIN  Left Forearm - Posterior  4.5 mm dark brown macule.     Lesion biopsy  Type of biopsy: tangential    Informed consent: discussed and consent obtained    Timeout: patient name, date of birth, surgical site, and procedure verified    Procedure prep:  Patient was prepped and draped  Anesthesia: the lesion was anesthetized in a standard fashion    Anesthetic:  1% lidocaine plain local infiltration  Instrument used: DermaBlade    Hemostasis achieved with: electrodesiccation    Outcome: patient tolerated procedure well    Post-procedure details: wound care instructions given    Additional details:  Cleaned area with isopropyl alcohol prior to anesthesia or biopsy. Applied thin layer of vaseline and covered with bandaid after procedure      Staff Communication: Dermatology Local Anesthesia: 1 % Plain Lidocaine - Amount: 0.5 ml  Specimen 1 - Dermatopathology- DERM LAB  Differential Diagnosis: MM vs DN vs irritated nevus  Check Margins Yes/No?:    Comments:    Dermpath Lab: Routine  Histopathology (formalin-fixed tissue)  Given chronic itchy mole, will biopsy today.     NUB  - Given uncertainty in clinical diagnosis, shave biopsy is recommended in clinic today.  - The patient expressed understanding, is in agreement with this plan, and wishes to proceed with biopsy.  - Oral and written wound care instructions provided.  - Advised patient that the office will call within 2 weeks to discuss biopsy results.    2. ATOPIC DERMATITIS AND RELATED CONDITION  Left Ankle - Anterior, Left Hand - Posterior, Left Upper Arm - Anterior, Pubic, Right Ankle - Anterior, Right Antecubital Fossa, Right Hand - Posterior, Right Upper Arm - Anterior  No red or pink scaly macules.   This is a 23 y.o. male  following up for atopic derm. Continues to be controlled with eucrisa BID PRN. Call for RF.   Related Medications  crisaborole 2 % ointment  Apply to affected areas twice daily when active as needed.    Return to clinic to be determined. Will call with results

## 2025-04-29 NOTE — Clinical Note
Given chronic itchy mole, will biopsy today.     NUB  - Given uncertainty in clinical diagnosis, shave biopsy is recommended in clinic today.  - The patient expressed understanding, is in agreement with this plan, and wishes to proceed with biopsy.  - Oral and written wound care instructions provided.  - Advised patient that the office will call within 2 weeks to discuss biopsy results.

## 2025-04-29 NOTE — Clinical Note
This is a 23 y.o. male  following up for atopic derm. Continues to be controlled with eucrisa BID PRN. Call for RF.

## 2025-05-06 LAB
LAB AP ASR DISCLAIMER: NORMAL
LABORATORY COMMENT REPORT: NORMAL
PATH REPORT.FINAL DX SPEC: NORMAL
PATH REPORT.GROSS SPEC: NORMAL
PATH REPORT.MICROSCOPIC SPEC OTHER STN: NORMAL
PATH REPORT.RELEVANT HX SPEC: NORMAL
PATH REPORT.TOTAL CANCER: NORMAL

## 2025-05-07 DIAGNOSIS — L81.8 ATYPICAL MELANOCYTIC HYPERPLASIA: Primary | ICD-10-CM

## 2025-06-24 ENCOUNTER — APPOINTMENT (OUTPATIENT)
Dept: DERMATOLOGY | Facility: CLINIC | Age: 25
End: 2025-06-24
Payer: COMMERCIAL

## 2025-06-24 DIAGNOSIS — D48.5 NEOPLASM OF UNCERTAIN BEHAVIOR OF SKIN: Primary | ICD-10-CM

## 2025-06-24 PROCEDURE — 11402 EXC TR-EXT B9+MARG 1.1-2 CM: CPT | Performed by: DERMATOLOGY

## 2025-06-24 PROCEDURE — 12032 INTMD RPR S/A/T/EXT 2.6-7.5: CPT | Performed by: DERMATOLOGY

## 2025-06-24 NOTE — LETTER
June 24, 2025     Patient: James Johnson   YOB: 2000   Date of Visit: 6/24/2025       To Whom It May Concern:    James Johnson was seen in my clinic on 6/24/2025 at 2:00 pm. Please allow James to be on light duty at work (no lifting more than 5 lbs or extensive left forearm twisting) for 2 weeks, until 7/9/2025.    If you have any questions or concerns, please don't hesitate to call.       Sincerely,       Miriam Anaya MD

## 2025-06-24 NOTE — PROGRESS NOTES
Excision Operative Note    Date of Surgery:  6/24/2025  Surgeon:  Miriam Anaya MD  Office Location:  [address] [facility]  [address]  [facility] [address]  [facility] [address]  Dept: [phone]  Dept Fax: [phone]  Referring Provider: Mason Wick, APRN-CNP  [address] [facility] [address]  [place] [address]    Subjective   James Jam Johnson is a 25 y.o. male who presents for the following: Excision (neoplasm uncertain behavior- Left forearm posterior) for neoplasm of uncertain behavior of skin.    According to the patient, the lesion has been present for approximately 6 month at the time of diagnosis.  The lesion is not causing symptoms.  The lesion has not been treated previously.    The patient does not have a pacemaker / defibrillator.  The patient does not have a heart valve / joint replacement.    The patient is not on blood thinners.   The patient does not have a history of hepatitis B or C.  The patient does not have a history of HIV.  The patient does not have a history of immunosuppression (e.g. organ transplantation, malignancy, medications)    Is it okay to leave a phone message with results? Yes  Who else may we leave results with: (name, relationship) n/a    The following portions of the chart were reviewed this encounter and updated as appropriate:         Assessment/Plan   Pre-procedure:   Obtained informed consent: written from patient  The surgical site was identified and confirmed with the patient.     Intra-operative:   Audible time out called at : 2:14 PM 06/24/25  by: Belen Meyer MA   Verified patient name, birthdate, site, specimen bottle label & requisition.    The planned procedure(s) was again reviewed with the patient. The risks of bleeding, infection, nerve damage and scarring were reviewed. The patient identity, surgical site, and planned procedure(s) were verified.     Biopsy Accession Number: [id number]  NEOPLASM OF UNCERTAIN BEHAVIOR OF SKIN  Left  Forearm - Posterior  Skin excision    Lesion length (cm):  0.9  Lesion width (cm):  0.9  Margin per side (cm):  0.5  Total excision diameter (cm):  1.9  Informed consent: discussed and consent obtained    Timeout: patient name, date of birth, surgical site, and procedure verified    Procedure prep:  Patient was prepped and draped  Anesthesia: the lesion was anesthetized in a standard fashion    Local anesthetic: 0.5% lido w/epi.  Instrument used: #15 blade    Hemostasis achieved with: electrodesiccation    Outcome: patient tolerated procedure well with no complications    Post-procedure details: sterile dressing applied and wound care instructions given    Dressing type: pressure dressing      Skin repair  Complexity:  Intermediate  Final length (cm):  4.3  Informed consent: discussed and consent obtained    Timeout: patient name, date of birth, surgical site, and procedure verified    Procedure prep:  Patient prepped in sterile fashion  Anesthesia: the lesion was anesthetized in a standard fashion    Local anesthetic: 0.5% lidocaine w/ epinephrine 1-100,000 local infiltration.  Reason for type of repair: reduce tension to allow closure    Undermining: edges undermined    Subcutaneous layers (deep stitches):   Suture size:  3-0  Suture type: Vicryl (polyglactin 910)    Stitches:  Buried vertical mattress  Fine/surface layer approximation (top stitches):   Suture size:  3-0  Suture type: Prolene (polypropylene)    Stitches: simple running    Hemostasis achieved with: electrodesiccation  Outcome: patient tolerated procedure well with no complications    Post-procedure details: sterile dressing applied and wound care instructions given    Dressing type: pressure dressing    Specimen 1 - Dermatopathology- DERM LAB  Differential Diagnosis:  Atypical Compound Melanocytic Proliferation  Check Margins Yes/No?:  Yes  Comments:    Dermpath Lab: Routine Histopathology (formalin-fixed tissue)  Intermediate Linear Repair:  Given  the location and size of the defect, it was determined that an intermediate layered linear closure was required to restore normal anatomy and function. The repair is an intermediate closure as two layers of sutures were required. The defect was undermined extensively at the level of the subcutaneous plane. Standing cutaneous cones were removed using Burow's triangles. The wound edges were brought into close approximation with buried vertical mattress sutures. The remainder of the wound was then closed with epidermal top sutures.    The final repair measured 4.3 cm      Wound care was discussed, and the patient was given written post-operative wound care instructions.      The patient will follow up with Miriam Anaya MD as needed for any post operative problems or concerns, and will follow up with their primary dermatologist as scheduled.       Belen PATEL MA  am scribing for, and in the presence of MD AMANDA Shaw Christina Y Wong, MD, personally performed the services described in the documentation as scribed by Belen Meyer MA in my presence, and confirm it is both accurate and complete.

## 2025-06-27 LAB
LABORATORY COMMENT REPORT: NORMAL
PATH REPORT.FINAL DX SPEC: NORMAL
PATH REPORT.GROSS SPEC: NORMAL
PATH REPORT.MICROSCOPIC SPEC OTHER STN: NORMAL
PATH REPORT.RELEVANT HX SPEC: NORMAL
PATH REPORT.TOTAL CANCER: NORMAL

## 2025-06-30 ENCOUNTER — TELEPHONE (OUTPATIENT)
Dept: DERMATOLOGY | Facility: CLINIC | Age: 25
End: 2025-06-30
Payer: COMMERCIAL

## 2025-06-30 NOTE — TELEPHONE ENCOUNTER
Surgical site: Left Forearm- Posterior  Date of procedure 6/24/25    Patient informed about pathology result which showed clear margins. No further surgery needed but the patient was advised to follow up with general dermatology. The patient had no further concerns and was advised to call with any questions.

## 2025-07-08 ENCOUNTER — APPOINTMENT (OUTPATIENT)
Dept: DERMATOLOGY | Facility: CLINIC | Age: 25
End: 2025-07-08
Payer: COMMERCIAL

## 2025-07-08 DIAGNOSIS — Z48.02 VISIT FOR SUTURE REMOVAL: Primary | ICD-10-CM

## 2025-07-08 PROCEDURE — 99024 POSTOP FOLLOW-UP VISIT: CPT | Performed by: DERMATOLOGY

## 2025-07-08 NOTE — PROGRESS NOTES
Office Follow Up Note    Visit Summary  Chief Complaint    1. Complaint Wound check.    James Johnson is a 25 y.o. male who presents for 2 week follow up after surgery for a neoplasm of uncertain behavior of skin. The patient has no concerns today.     Location Operation site location: Left Forearm - Posterior     On exam,  Mr. Johnson is well-appearing and in no apparent distress. The surgical site appears clean with minimal to no erythema. No tenderness and good wound edge apposition.    Assessment and Plan:  History of skin cancer requiring ongoing monitoring for recurrence and additional lesion development.   The patient was reassured that the wound is healing appropriately.   Sutures were removed without complication today.  The dressing was removed, the wound cleaned a a new dressing reapplied.     The patient was advised on the importance of routine skin monitoring including follow up with general dermatology and instructed to call with any further concerns. The patient will return as needed     Linda PATEL RN  am scribing for, and in the presence of MD AMANDA Shaw Christina Y Wong, MD, personally performed the services described in the documentation as scribed by Linda Snell RN in my presence, and confirm it is both accurate and complete.

## 2025-10-21 ENCOUNTER — APPOINTMENT (OUTPATIENT)
Dept: DERMATOLOGY | Facility: CLINIC | Age: 25
End: 2025-10-21
Payer: COMMERCIAL

## 2026-04-21 ENCOUNTER — APPOINTMENT (OUTPATIENT)
Dept: DERMATOLOGY | Facility: CLINIC | Age: 26
End: 2026-04-21
Payer: COMMERCIAL